# Patient Record
Sex: FEMALE | Race: AMERICAN INDIAN OR ALASKA NATIVE | Employment: FULL TIME | URBAN - METROPOLITAN AREA
[De-identification: names, ages, dates, MRNs, and addresses within clinical notes are randomized per-mention and may not be internally consistent; named-entity substitution may affect disease eponyms.]

---

## 2017-10-23 ENCOUNTER — TELEPHONE (OUTPATIENT)
Dept: PEDIATRICS | Facility: CLINIC | Age: 47
End: 2017-10-23

## 2017-10-23 NOTE — TELEPHONE ENCOUNTER
Called and spoke to Sabrina about [facility] appointments on 10/27/17.  Discussed what providers they would be seeing and where clinic is located.  Mom had no other questions at this time.

## 2017-10-27 ENCOUNTER — OFFICE VISIT (OUTPATIENT)
Dept: ENDOCRINOLOGY | Facility: CLINIC | Age: 47
End: 2017-10-27
Attending: INTERNAL MEDICINE
Payer: COMMERCIAL

## 2017-10-27 ENCOUNTER — OFFICE VISIT (OUTPATIENT)
Dept: PSYCHOLOGY | Facility: CLINIC | Age: 47
End: 2017-10-27
Attending: PSYCHOLOGIST
Payer: COMMERCIAL

## 2017-10-27 ENCOUNTER — OFFICE VISIT (OUTPATIENT)
Dept: PEDIATRICS | Facility: CLINIC | Age: 47
End: 2017-10-27
Attending: PEDIATRICS
Payer: COMMERCIAL

## 2017-10-27 VITALS
HEIGHT: 64 IN | WEIGHT: 284.39 LBS | SYSTOLIC BLOOD PRESSURE: 132 MMHG | BODY MASS INDEX: 48.55 KG/M2 | DIASTOLIC BLOOD PRESSURE: 84 MMHG | HEART RATE: 50 BPM

## 2017-10-27 DIAGNOSIS — E66.01 OBESITY, MORBID (H): Primary | ICD-10-CM

## 2017-10-27 DIAGNOSIS — E66.01 MORBID OBESITY (H): Primary | ICD-10-CM

## 2017-10-27 LAB
ALT SERPL W P-5'-P-CCNC: 65 U/L (ref 0–50)
ANION GAP SERPL CALCULATED.3IONS-SCNC: 6 MMOL/L (ref 3–14)
BUN SERPL-MCNC: 14 MG/DL (ref 7–30)
CALCIUM SERPL-MCNC: 8.8 MG/DL (ref 8.5–10.1)
CHLORIDE SERPL-SCNC: 108 MMOL/L (ref 94–109)
CO2 SERPL-SCNC: 26 MMOL/L (ref 20–32)
CREAT SERPL-MCNC: 0.68 MG/DL (ref 0.52–1.04)
GFR SERPL CREATININE-BSD FRML MDRD: >90 ML/MIN/1.7M2
GLUCOSE SERPL-MCNC: 99 MG/DL (ref 70–99)
HBA1C MFR BLD: 6.2 % (ref 4.3–6)
POTASSIUM SERPL-SCNC: 4.4 MMOL/L (ref 3.4–5.3)
SODIUM SERPL-SCNC: 140 MMOL/L (ref 133–144)

## 2017-10-27 PROCEDURE — 99212 OFFICE O/P EST SF 10 MIN: CPT | Mod: ZF

## 2017-10-27 PROCEDURE — 80048 BASIC METABOLIC PNL TOTAL CA: CPT | Performed by: INTERNAL MEDICINE

## 2017-10-27 PROCEDURE — 84460 ALANINE AMINO (ALT) (SGPT): CPT | Performed by: INTERNAL MEDICINE

## 2017-10-27 PROCEDURE — 83036 HEMOGLOBIN GLYCOSYLATED A1C: CPT | Performed by: INTERNAL MEDICINE

## 2017-10-27 PROCEDURE — 97802 MEDICAL NUTRITION INDIV IN: CPT | Performed by: DIETITIAN, REGISTERED

## 2017-10-27 PROCEDURE — 36415 COLL VENOUS BLD VENIPUNCTURE: CPT | Performed by: INTERNAL MEDICINE

## 2017-10-27 RX ORDER — TOPIRAMATE 25 MG/1
TABLET, FILM COATED ORAL
Qty: 90 TABLET | Refills: 3 | Status: SHIPPED | OUTPATIENT
Start: 2017-10-27 | End: 2018-01-26

## 2017-10-27 ASSESSMENT — PAIN SCALES - GENERAL: PAINLEVEL: NO PAIN (0)

## 2017-10-27 NOTE — MR AVS SNAPSHOT
After Visit Summary   10/27/2017    Sabrina Argeullo    MRN: [id number]           Patient Information     Date Of Birth          1970        Visit Information        Provider Department      10/27/2017 10:00 AM Chrissy Sandoval, PhD LP Peds Psychology        Today's Diagnoses     Obesity, morbid (H)    -  1       Follow-ups after your visit        Your next 10 appointments already scheduled     Dec 22, 2017  8:15 AM CST   Return Visit with Sherron Lopez RD   Peds Weight Management ([facility])    [facility]  3rd Flr  [address] [place] [id number] [phone]            Dec 22, 2017  9:00 AM CST   Return Visit with Chrissy Sandoval, PhD LP   Peds Psychology ([facility])    [facility]  [address] [facility]  [address] [place] [phone] [phone]              Who to contact     Please call your clinic at [phone] to:    Ask questions about your health    Make or cancel appointments    Discuss your medicines    Learn about your test results    Speak to your doctor   If you have compliments or concerns about an experience at your clinic, or if you wish to file a complaint, please contact [facility] Patient Relations at [phone] or email us at [email or web address]         Additional Information About Your Visit        MyChart Information     Integrated [employer] is an electronic gateway that provides easy, online access to your medical records. With Ketch[employer], you can request a clinic appointment, read your test results, renew a prescription or communicate with your care team.     To sign up for [employer] visit the website at [email or web address]   You will be asked to enter the access code listed below, as well as some personal information. Please follow the directions to create your username and password.     Your access code is: [email or web address]  Expires: 1/26/2018  2:23 PM     Your access code will   in 90 days. If you need help or a new code, please contact your [facility] or call [phone] for assistance.        Care EveryWhere ID     This is your Care EveryWhere ID. This could be used by other organizations to access your [place] medical records  [id number]         Blood Pressure from Last 3 Encounters:   10/27/17 132/84    Weight from Last 3 Encounters:   17 282 lb 3 oz (128 kg)   10/27/17 284 lb 6.3 oz (129 kg)              Today, you had the following     No orders found for display         Today's Medication Changes          These changes are accurate as of: 10/27/17 11:59 PM.  If you have any questions, ask your nurse or doctor.               Start taking these medicines.        Dose/Directions    topiramate 25 MG tablet   Commonly known as:  TOPAMAX   Used for:  Morbid obesity (H)   Started by:  Adria Shore MD        25 mg at bedtime for 1 week, 50 mg at bedtime for 1 week and 75 mg daily at bedtime thereafter   Quantity:  90 tablet   Refills:  3            Where to get your medicines      These medications were sent to [facility] [place], [place] -  [facility]   [facility] [address]     Phone:  [phone]     topiramate 25 MG tablet                Primary Care Provider Office Phone # Fax #    Lidia Grissom [phone]       [place] [id number] [facility] [id number]  [facility] [place] [address]        Equal Access to Services     RACHEL ELLINGTON AH: Hadii aad ku hadasho Soomaali, waaxda luqadaha, qaybta kaalmada adeegyada, luis dias . So [facility] [phone].    ATENCIÓN: Si habla español, tiene a pace disposición servicios gratuitos de asistencia lingüística. Llame al [phone].    We comply with applicable federal civil rights laws and [place] laws. We do not discriminate on the basis of race, color, national origin, age, disability, sex, sexual  orientation, or gender identity.            Thank you!     Thank you for choosing PEDS PSYCHOLOGY  for your care. Our goal is always to provide you with excellent care. Hearing back from our patients is one way we can continue to improve our services. Please take a few minutes to complete the written survey that you may receive in the mail after your visit with us. Thank you!             Your Updated Medication List - Protect others around you: Learn how to safely use, store and throw away your medicines at www.disposemymeds.org.          This list is accurate as of: 10/27/17 11:59 PM.  Always use your most recent med list.                   Brand Name Dispense Instructions for use Diagnosis    LORAZEPAM PO      Take by mouth as needed for anxiety        topiramate 25 MG tablet    TOPAMAX    90 tablet    25 mg at bedtime for 1 week, 50 mg at bedtime for 1 week and 75 mg daily at bedtime thereafter    Morbid obesity (H)
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
## 2017-10-27 NOTE — NURSING NOTE
"Chief Complaint   Patient presents with     Consult     Weight mgmt       Initial /79 (BP Location: Right arm, Patient Position: Chair, Cuff Size: Adult Large)  Pulse 50  Ht 5' 3.54\" (161.4 cm)  Wt 284 lb 6.3 oz (129 kg)  BMI 49.52 kg/m2 Estimated body mass index is 49.52 kg/(m^2) as calculated from the following:    Height as of this encounter: 5' 3.54\" (161.4 cm).    Weight as of this encounter: 284 lb 6.3 oz (129 kg).  Medication Reconciliation: complete    "

## 2017-10-27 NOTE — MR AVS SNAPSHOT
MRN:[id number]                      After Visit Summary   10/27/2017    Sabrina Arguello    MRN: [id number]           Visit Information        Provider Department      10/27/2017 9:30 AM Sherron Lopez RD Peds Weight Management        MyChart Information     [employer] Productions[employer] is an electronic gateway that provides easy, online access to your medical records. With [employer], you can request a clinic appointment, read your test results, renew a prescription or communicate with your care team.     To sign up for [employer] visit the website at [email or web address]   You will be asked to enter the access code listed below, as well as some personal information. Please follow the directions to create your username and password.     Your access code is: [email or web address]  Expires: 2018  3:23 PM     Your access code will  in 90 days. If you need help or a new code, please contact your [facility] or call [phone] for assistance.        Care EveryWhere ID     This is your Care EveryWhere ID. This could be used by other organizations to access your [place] medical records  [id number]        Equal Access to Services     RACHEL ELLINGTON : Hadkayden Braxton, sully simmons, jayro hernandes, luis barba. So [facility] [phone].    ATENCIÓN: Si habla español, tiene a pace disposición servicios gratuitos de asistencia lingüística. Llame al [phone].    We comply with applicable federal civil rights laws and [place] laws. We do not discriminate on the basis of race, color, national origin, age, disability, sex, sexual orientation, or gender identity.

## 2017-10-27 NOTE — LETTER
"  10/27/2017      RE: Sabrina Arguello  [address]  [facility] [address]       Medical Nutrition Therapy  Nutrition Assessment  Patient seen in Family/Pediatric Weight Mangement Clinic, accompanied by jenna.    Anthropometrics  Age:  46 year old female   Height:  161.4 cm (5' 3.54\")  Weight:  129 kg (284 lb 6.3 oz)  BMI: 49.62  Nutrition History  [facility] nutrition assessment. Patient lives with , son and daughter. She first brought her daughter to the pediatric weight management clinic and expressed a desire to lose weight herself. Patient will often skip breakfast if she doesn't have time. She will eat lunch at work - going out with a coworker about 1 time a week - mom states her coworker is helpful with making healthier choices. Meals are heavy in carbohydrates. She does have some characteristics of binge eating.     Nutritional Intakes  Sample intake includes:  Breakfast:   Skips - or oatmeal with blueberries  Am Snack:   None reported  Lunch:   Out with coworker 1x/week   PM Snack:   None reported  Dinner:   Chicken and rice or potatoes  HS Snack:   Peanuts and candy corn      Dining Out  Frequency:  1 times per week  Location:  restaurant  Types of Food:  BLT with soup or burger and fries      Medications/Vitamins/Minerals    Current Outpatient Prescriptions:      LORAZEPAM PO, Take by mouth as needed for anxiety, Disp: , Rfl:      topiramate (TOPAMAX) 25 MG tablet, 25 mg at bedtime for 1 week, 50 mg at bedtime for 1 week and 75 mg daily at bedtime thereafter, Disp: 90 tablet, Rfl: 3    Nutrition Diagnosis  Obesity related to excessive energy intake as evidenced by BMI/age >95th %ile    Interventions & Education  Provided written and verbal education on the following:    Healthy snacks  Consume 3 or 4 meals a day  Breakfast    Reviewed dietary recall and patient's progress since last appointment. Discussed with patient the " importance of eating breakfast daily. Brainstormed healthy quick breakfast options. Mom was wondering about protein shakes - provided guidelines when purchasing protein shakes (low sugar). Discussed healthy snacks to include a fruit or vegetable + protein source. Brainstormed healthy snack options to have at home and encouraged patient to get rid of the tempting foods - chips, crackers, ice cream, peanuts with candy corn. Briefly talked about changes to make when eating out - keep calories close to 500 at a meal.     Goals  1) Reduce BMI  2) Eat breakfast daily  3) Healthy snacks - fruit or vegetable + protein  4) Choose healthy option eating out    Monitoring/Evaluation  Will continue to monitor progress towards goals and provide education in Family/Pediatric Weight Management.    Spent 45 minutes in consult with patient & daughter.      Sherron Lopez MS, RD, LD  Pager # [phone]

## 2017-10-27 NOTE — LETTER
Patient:  Sabrina Arguello  :   1970  MRN:     [id number]        Ms.Naomi Radha Arguello  [address] [facility] [address]        2017    Dear ,    We are writing to inform you of your test results.  The HbA1c, which reflects about 2-3 months of average blood sugar control, is consitent with pre-diabetes.  She ALT liver test is minimally above the typical range and could be related to fat in the liver.  Working on wt loss is a good way to help improve both of these.  How are things going with the new topiramate medication? Let us know if you have any questions or concerns between visits.      Resulted Orders   Hemoglobin A1c   Result Value Ref Range    Hemoglobin A1C 6.2 (H) 4.3 - 6.0 %   ALT   Result Value Ref Range    ALT 65 (H) 0 - 50 U/L   Basic metabolic panel   Result Value Ref Range    Sodium 140 133 - 144 mmol/L    Potassium 4.4 3.4 - 5.3 mmol/L    Chloride 108 94 - 109 mmol/L    Carbon Dioxide 26 20 - 32 mmol/L    Anion Gap 6 3 - 14 mmol/L    Glucose 99 70 - 99 mg/dL    Urea Nitrogen 14 7 - 30 mg/dL    Creatinine 0.68 0.52 - 1.04 mg/dL    GFR Estimate >90 >60 mL/min/1.7m2      Comment:      Non  GFR Calc    GFR Estimate If Black >90 >60 mL/min/1.7m2      Comment:       GFR Calc    Calcium 8.8 8.5 - 10.1 mg/dL       Adria Shore MD

## 2017-10-27 NOTE — LETTER
Date:December 11, 2017      Provider requested that no letter be sent. Do not send.       [facility] Health Information

## 2017-10-27 NOTE — LETTER
10/27/2017      RE: Sabrina Arguello  [address]  [facility] [address]       Pediatric Psychology Contact Note    Sabrina is a 47-year-old female seen in Family Weight Management Clinic with her daughter. On initial meeting, the role of psychology was introduced. The remainder of the time was spent discussing daughter's progress with weight management. During next family clinic, will spend more time assessing overall family dynamic.    Chrissy Sandoval, PhD, LP, BCBA-D   of Pediatrics  Board Certified Behavior Analyst-Doctoral  Department of Pediatrics    *no charge brief contact  * no letter    Chrissy Sandoval LP, PhD LP

## 2017-10-27 NOTE — PROGRESS NOTES
"Medical Nutrition Therapy  Nutrition Assessment  Patient seen in Family/Pediatric Weight Mangement Clinic, accompanied by jenna.    Anthropometrics  Age:  46 year old female   Height:  161.4 cm (5' 3.54\")  Weight:  129 kg (284 lb 6.3 oz)  BMI: 49.62  Nutrition History  [facility] nutrition assessment. Patient lives with , son and daughter. She first brought her daughter to the pediatric weight management clinic and expressed a desire to lose weight herself. Patient will often skip breakfast if she doesn't have time. She will eat lunch at work - going out with a coworker about 1 time a week - mom states her coworker is helpful with making healthier choices. Meals are heavy in carbohydrates. She does have some characteristics of binge eating.     Nutritional Intakes  Sample intake includes:  Breakfast:   Skips - or oatmeal with blueberries  Am Snack:   None reported  Lunch:   Out with coworker 1x/week   PM Snack:   None reported  Dinner:   Chicken and rice or potatoes  HS Snack:   Peanuts and candy corn      Dining Out  Frequency:  1 times per week  Location:  restaurant  Types of Food:  BLT with soup or burger and fries      Medications/Vitamins/Minerals    Current Outpatient Prescriptions:      LORAZEPAM PO, Take by mouth as needed for anxiety, Disp: , Rfl:      topiramate (TOPAMAX) 25 MG tablet, 25 mg at bedtime for 1 week, 50 mg at bedtime for 1 week and 75 mg daily at bedtime thereafter, Disp: 90 tablet, Rfl: 3    Nutrition Diagnosis  Obesity related to excessive energy intake as evidenced by BMI/age >95th %ile    Interventions & Education  Provided written and verbal education on the following:    Healthy snacks  Consume 3 or 4 meals a day  Breakfast    Reviewed dietary recall and patient's progress since last appointment. Discussed with patient the importance of eating breakfast daily. Brainstormed healthy quick breakfast options. Mom was wondering " about protein shakes - provided guidelines when purchasing protein shakes (low sugar). Discussed healthy snacks to include a fruit or vegetable + protein source. Brainstormed healthy snack options to have at home and encouraged patient to get rid of the tempting foods - chips, crackers, ice cream, peanuts with candy corn. Briefly talked about changes to make when eating out - keep calories close to 500 at a meal.     Goals  1) Reduce BMI  2) Eat breakfast daily  3) Healthy snacks - fruit or vegetable + protein  4) Choose healthy option eating out    Monitoring/Evaluation  Will continue to monitor progress towards goals and provide education in Family/Pediatric Weight Management.    Spent 45 minutes in consult with patient & daughter.      Sherron Lopez MS, RD, LD  Pager # [phone]

## 2017-11-15 ENCOUNTER — TELEPHONE (OUTPATIENT)
Dept: PEDIATRICS | Facility: CLINIC | Age: 47
End: 2017-11-15

## 2017-11-15 NOTE — TELEPHONE ENCOUNTER
Called and spoke to Sabrina to check in to see how Topiramate is working for her.  Sabrina reports the new medication is fine.  She has noticed a decrease in her appetite over the past couple of days.  She started taking 75mg on Saturday, 11/11/17.  In the beginning she did feel like her anxiety increased, but now that has gone away.  She occasionally feels tingling in her hands and feet, but it has only happened a couple of time.  No other side effects noticed.      Reminded her of appointment with dietitian on 11/29/17.  Sabrina had no other questions at this time.
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
## 2017-11-29 ENCOUNTER — OFFICE VISIT (OUTPATIENT)
Dept: PEDIATRICS | Facility: CLINIC | Age: 47
End: 2017-11-29
Attending: DIETITIAN, REGISTERED
Payer: COMMERCIAL

## 2017-11-29 PROCEDURE — 97803 MED NUTRITION INDIV SUBSEQ: CPT | Performed by: DIETITIAN, REGISTERED

## 2017-11-29 NOTE — MR AVS SNAPSHOT
MRN:[id number]                      After Visit Summary   2017    Sabrina Arguello    MRN: [id number]           Visit Information        Provider Department      2017 2:30 PM Sherron Lopez RD Peds Weight Management        Your next 10 appointments already scheduled     Dec 22, 2017  8:15 AM CST   Return Visit with Sherron Lopez RD   Peds Weight Management ([facility])    [facility]  [address]rd [facility]  [address] [place] [address]   [phone]            Dec 22, 2017  9:00 AM CST   Return Visit with Chrissy Sandoval, PhD LP   Peds Psychology ([facility])    [facility]  [address]  [address] [place] [address]   [phone]              Georama Information     Georama is an electronic gateway that provides easy, online access to your medical records. With Georama, you can request a clinic appointment, read your test results, renew a prescription or communicate with your care team.     To sign up for Ge[employer] visit the website at [email or web address]   You will be asked to enter the access code listed below, as well as some personal information. Please follow the directions to create your username and password.     Your access code is: [email or web address]  Expires: 2018  2:23 PM     Your access code will  in 90 days. If you need help or a new code, please contact your [facility] or call [phone] for assistance.        Care EveryWhere ID     This is your Care EveryWhere ID. This could be used by other organizations to access your [facility] medical records  [id number]        Equal Access to Services     RACHEL ELLINGTON : Hadii neo santacruz hadasho Sorosendaali, waaxda luqadaha, qaybta kaalmada adejustine, luis barba. So [facility] [phone].    ATENCIÓN: Si habla español, tiene a pace disposición servicios gratuitos de asistencia lingüística. Llame al [phone].    We  comply with applicable federal civil rights laws and [place] laws. We do not discriminate on the basis of race, color, national origin, age, disability, sex, sexual orientation, or gender identity.

## 2017-11-29 NOTE — LETTER
"  11/29/2017      RE: Sabrina Arguello  [address]  [facility] [address]       Medical Nutrition Therapy  Nutrition Reassessment  Patient seen in Family/Pediatric Weight Mangement Clinic, accompanied by her daughter.    Anthropometrics  Age:  46 year old female   Height:  161.4 cm  Normalized stature-for-age data not available for patients older than 20 years.    Weight:  128 kg (actual weight), 282 lbs 3.02 oz, Normalized weight-for-age data not available for patients older than 20 years.  BMI:  Body mass index is 49.14 kg/(m^2)., Normalized BMI data available only for age 0 to 20 years.  Weight Loss 2 lbs since last clinic visit on 10/27/17.  Nutrition History  Patient seen in [facility] for family weight management follow up. At last visit patient was started on topiramate and has been on the 3 pill dose for 2 weeks. She states she has noticed a difference and even thinks her taste buds have changed (some foods do no taste as good). She also feels that being on the medication has calmed her mood (her daughter agrees). She reports that she doesn't want a \"big meal\" any more. Patient was also able to cut out pop (doesn't taste good any more). The family as a whole has really decreased eating out - maybe a couple times in the past month.     Medications/Vitamins/Minerals    Current Outpatient Prescriptions:      LORAZEPAM PO, Take by mouth as needed for anxiety, Disp: , Rfl:      topiramate (TOPAMAX) 25 MG tablet, 25 mg at bedtime for 1 week, 50 mg at bedtime for 1 week and 75 mg daily at bedtime thereafter, Disp: 90 tablet, Rfl: 3    Previous Goals & Progress  1) Reduce BMI - ongoing goal (lost 2 lbs)  2) Eat breakfast daily - ongoing goal (still working on it)  3) Healthy snacks - fruit or vegetable + protein - ongoing goal   4) Choose healthy option eating out - ongoing goal     Nutrition Diagnosis  Obesity related to excessive energy intake as evidenced by BMI/age >95th " %ile    Interventions & Education  Provided written and verbal education on the following:    Food record  Plate Method  Healthy snacks  Healthy beverages  Portion sizes  Increase fruit and vegetable intake  Breakfast    Reviewed previous nutrition goals and patient's progress since last appointment. Discussed patient's goal with eating breakfast more consistently - mom states she tried one of the protein shakes but found it to be very chalky tasting but was going to try the other options we had discussed at the previous visit. Talked about tracking eating with food logs and encouraged her to to help create more awareness - talked about using the [employer] Pal or handwritten. Encouraged patient to continue with previous goals and have a goal to lose at least 4 lbs before the next appointment in December.     Goals  1) Reduce BMI  2) Food log - alec or hand written  3) Work on eating breakfast more consistently   4) Continue to monitor portion sizes    Monitoring/Evaluation  Will continue to monitor progress towards goals and provide education in Family/Pediatric Weight Management.    Spent 30 minutes in consult with patient & daughter.      Sherron Lopez MS, RD, LD  Pager # [phone]

## 2017-12-01 VITALS — WEIGHT: 282.19 LBS | BODY MASS INDEX: 48.18 KG/M2 | HEIGHT: 64 IN

## 2017-12-01 NOTE — PROGRESS NOTES
"Medical Nutrition Therapy  Nutrition Reassessment  Patient seen in Family/Pediatric Weight Mangement Clinic, accompanied by her daughter.    Anthropometrics  Age:  46 year old female   Height:  161.4 cm  Normalized stature-for-age data not available for patients older than 20 years.    Weight:  128 kg (actual weight), 282 lbs 3.02 oz, Normalized weight-for-age data not available for patients older than 20 years.  BMI:  Body mass index is 49.14 kg/(m^2)., Normalized BMI data available only for age 0 to 20 years.  Weight Loss 2 lbs since last clinic visit on 10/27/17.  Nutrition History  Patient seen in [facility] for family weight management follow up. At last visit patient was started on topiramate and has been on the 3 pill dose for 2 weeks. She states she has noticed a difference and even thinks her taste buds have changed (some foods do no taste as good). She also feels that being on the medication has calmed her mood (her daughter agrees). She reports that she doesn't want a \"big meal\" any more. Patient was also able to cut out pop (doesn't taste good any more). The family as a whole has really decreased eating out - maybe a couple times in the past month.     Medications/Vitamins/Minerals    Current Outpatient Prescriptions:      LORAZEPAM PO, Take by mouth as needed for anxiety, Disp: , Rfl:      topiramate (TOPAMAX) 25 MG tablet, 25 mg at bedtime for 1 week, 50 mg at bedtime for 1 week and 75 mg daily at bedtime thereafter, Disp: 90 tablet, Rfl: 3    Previous Goals & Progress  1) Reduce BMI - ongoing goal (lost 2 lbs)  2) Eat breakfast daily - ongoing goal (still working on it)  3) Healthy snacks - fruit or vegetable + protein - ongoing goal   4) Choose healthy option eating out - ongoing goal     Nutrition Diagnosis  Obesity related to excessive energy intake as evidenced by BMI/age >95th %ile    Interventions & Education  Provided written and verbal education on the following:    Food record  Plate " Method  Healthy snacks  Healthy beverages  Portion sizes  Increase fruit and vegetable intake  Breakfast    Reviewed previous nutrition goals and patient's progress since last appointment. Discussed patient's goal with eating breakfast more consistently - mom states she tried one of the protein shakes but found it to be very chalky tasting but was going to try the other options we had discussed at the previous visit. Talked about tracking eating with food logs and encouraged her to to help create more awareness - talked about using the [employer] Pal or handwritten. Encouraged patient to continue with previous goals and have a goal to lose at least 4 lbs before the next appointment in December.     Goals  1) Reduce BMI  2) Food log - alec or hand written  3) Work on eating breakfast more consistently   4) Continue to monitor portion sizes    Monitoring/Evaluation  Will continue to monitor progress towards goals and provide education in Family/Pediatric Weight Management.    Spent 30 minutes in consult with patient & daughter.      Sherron Lopez MS, RD, LD  Pager # [phone]

## 2017-12-11 NOTE — PROGRESS NOTES
Pediatric Psychology Contact Note    Sabrina is a 47-year-old female seen in Family Weight Management Clinic with her daughter. On initial meeting, the role of psychology was introduced. The remainder of the time was spent discussing daughter's progress with weight management. During next family clinic, will spend more time assessing overall family dynamic.    Chrissy Sandoval, PhD, LP, BCBA-D   of Pediatrics  Board Certified Behavior Analyst-Doctoral  Department of Pediatrics    *no charge brief contact  * no letter

## 2017-12-20 ENCOUNTER — TELEPHONE (OUTPATIENT)
Dept: PEDIATRICS | Facility: CLINIC | Age: 47
End: 2017-12-20

## 2017-12-20 NOTE — TELEPHONE ENCOUNTER
Called and spoke to Sabrina.  Discussed lab letter.  Sabrina had no questions at this time.    Also, discussed Topiramate.  Sabrina reports that it has curbed her appetite.  She has noticed in the past week that she feels like the medication is leveling off.  She feels her appetite is starting to increase gradually.  Sabrina is currently taking 75mg q day.  She is no longer experiencing the tingling she felt in the beginning.  She denies other side effects.      Reminded her of appointment with dietitian on 12/22/17.  She had no other questions at this time.

## 2017-12-22 ENCOUNTER — OFFICE VISIT (OUTPATIENT)
Dept: PEDIATRICS | Facility: CLINIC | Age: 47
End: 2017-12-22
Attending: DIETITIAN, REGISTERED
Payer: COMMERCIAL

## 2017-12-22 ENCOUNTER — OFFICE VISIT (OUTPATIENT)
Dept: PSYCHOLOGY | Facility: CLINIC | Age: 47
End: 2017-12-22
Attending: PSYCHOLOGIST
Payer: COMMERCIAL

## 2017-12-22 VITALS — BODY MASS INDEX: 47.84 KG/M2 | HEIGHT: 64 IN | WEIGHT: 280.2 LBS

## 2017-12-22 DIAGNOSIS — E66.01 MORBID OBESITY (H): Primary | ICD-10-CM

## 2017-12-22 PROCEDURE — 97803 MED NUTRITION INDIV SUBSEQ: CPT | Performed by: DIETITIAN, REGISTERED

## 2017-12-22 NOTE — MR AVS SNAPSHOT
MRN:[id number]                      After Visit Summary   2017    Sabrina Arguello    MRN: [id number]           Visit Information        Provider Department      2017 8:15 AM Sherron Lopez RD Peds Weight Management        Your next 10 appointments already scheduled     2018  8:30 AM CST   Return Weight Management Visit with Adria Shore MD   [facility] Adult Weight Mgt [facility] ([facility])    [facility] [address] 3[address]  [facility] [address]   [phone]            2018  9:30 AM CST   Return Visit with Chrissy Sandoval, PhD LP   Peds Psychology ([facility])    [place] [address] [address] [facility] [address]  [address] [place] [address] [phone]            2018 10:30 AM CST   Return Visit with JEFFY Dickenss Weight Management ([facility])    [address]  [facility] [place] [address]   [phone]              [email or web address] Information     [email or web address] is an electronic gateway that provides easy, online access to your medical records. With [email or web address], you can request a clinic appointment, read your test results, renew a prescription or communicate with your care team.     To sign up for [email or web address] visit the website at [email or web address]   You will be asked to enter the access code listed below, as well as some personal information. Please follow the directions to create your username and password.     Your access code is: [email or web address]  Expires: 2018  2:23 PM     Your access code will  in 90 days. If you need help or a new code, please contact your [facility] or call [phone] for assistance.        Care EveryWhere ID     This is your Care EveryWhere ID. This could be used by other organizations to access your [facility] medical records  [id number]        Equal Access to Services     RACHEL ELLINGTON : Tmoas Braxton  sully simmons, priscillata sanjuana hernandes, luis dias ah. So [facility] [phone].    ATENCIÓN: Si habla español, tiene a pace disposición servicios gratuitos de asistencia lingüística. Llame al [phone].    We comply with applicable federal civil rights laws and [place] laws. We do not discriminate on the basis of race, color, national origin, age, disability, sex, sexual orientation, or gender identity.

## 2017-12-22 NOTE — PROGRESS NOTES
Pediatric Psychology Progress Note    Start time: 9:00am  Stop time: 9:15am  Service: [id number]  Diagnosis: obesity    Subjective: Sabrina is a 47 year old female seen in family weight management clinic with her daughter.     Objective: Spent the session discussing changes the family has made, including removing tempting food. Sabrina was open about her inability to control eating if these are in the home. She has noticed some increase in ability to control (e.g., she can leave ice cream in freezer that is for her son). She reported that she misses Diet Coke, which does not taste the same since starting Topiramate; however, she reported that the pros far outweigh the cons of taking the medication. Her primary goal of coming to clinic is to establish good habits for her daughter; secondarily, she would like to improve her own health.     Assessment: Sabrina was open and honest in session about her own strengths and difficulties, as well as her family's. She seems motivated to continue on the path to healthier lifestyle.    Plan: Follow up in one month.    Chrissy Sandoval, PhD, LP, BCBA-D   of Pediatrics  Board Certified Behavior Analyst-Doctoral  Department of Pediatrics    *no letter

## 2017-12-22 NOTE — LETTER
12/22/2017      RE: Sabrina Arguello  [address]  [facility] [address]       Medical Nutrition Therapy  Nutrition Reassessment  Patient seen in Family/Pediatric Weight Mangement Clinic, accompanied by daughter.    Anthropometrics  Age:  47 year old female   Height:  161.4 cm  Normalized stature-for-age data not available for patients older than 20 years.    Weight:  127.1 kg (actual weight), 280 lbs 3.27 oz, Normalized weight-for-age data not available for patients older than 20 years.  BMI:  Body mass index is 48.79 kg/(m^2)., Normalized BMI data available only for age 0 to 20 years.  Weight Loss 2 lbs since last clinic visit on 11/29/17.  Nutrition History  Patient seen in [facility] for weight management follow up. Patient has lost 2 lbs in the past month. Patient has been trying to eat breakfast - tried Atkins strawberry protein shake and likes them. She will also eat the leftovers of her kids breakfast (1 slice of french toast or omelet). The family has really made great changes together - eating meals more at home. Last night they had pizza and would have eaten 2 whole pizza as a family of 4 but only 1 (less than 1). Patient states her appetite is down a lot since starting the topiramate. She also states her taste buds are different - the taste of pop and beer are not good any more.     Medications/Vitamins/Minerals    Current Outpatient Prescriptions:      LORAZEPAM PO, Take by mouth as needed for anxiety, Disp: , Rfl:      topiramate (TOPAMAX) 25 MG tablet, 25 mg at bedtime for 1 week, 50 mg at bedtime for 1 week and 75 mg daily at bedtime thereafter, Disp: 90 tablet, Rfl: 3    Previous Goals & Progress  1) Reduce BMI - ongoing goal (lost 2 lb)  2) Food log - alec or hand written - goal not met (forgot to do it)  3) Work on eating breakfast more consistently - ongoing goal   4) Continue to monitor portion sizes - ongoing goal     Nutrition Diagnosis  Obesity related to excessive energy  intake as evidenced by BMI/age >95th %ile    Interventions & Education  Provided written and verbal education on the following:    Food record  Plate Method  Healthy lunchs  Healthy meals/cooking  Healthy snacks  Healthy beverages  Portion sizes  Increase fruit and vegetable intake  Consume 3 or 4 meals a day    Goals  1) Reduce BMI  2) Continue to eat regularly throughout the day  3) Continue to decrease portion sizes     Monitoring/Evaluation  Will continue to monitor progress towards goals and provide education in Family/Pediatric Weight Management.    Spent 30 minutes in consult with patient & daugther.      Sherron Lopez MS, RD, LD  Pager # [phone]

## 2017-12-22 NOTE — LETTER
12/22/2017      RE: Sabrina Arguello  [address]  [facility] [address]       Pediatric Psychology Progress Note    Start time: 9:00am  Stop time: 9:15am  Service: [id number]  Diagnosis: obesity    Subjective: Sabrina is a 47 year old female seen in family weight management clinic with her daughter.     Objective: Spent the session discussing changes the family has made, including removing tempting food. Sabrina was open about her inability to control eating if these are in the home. She has noticed some increase in ability to control (e.g., she can leave ice cream in freezer that is for her son). She reported that she misses Diet Coke, which does not taste the same since starting Topiramate; however, she reported that the pros far outweigh the cons of taking the medication. Her primary goal of coming to clinic is to establish good habits for her daughter; secondarily, she would like to improve her own health.     Assessment: Sabrina was open and honest in session about her own strengths and difficulties, as well as her family's. She seems motivated to continue on the path to healthier lifestyle.    Plan: Follow up in one month.    Chrissy Sandoval, PhD, LP, BCBA-D   of Pediatrics  Board Certified Behavior Analyst-Doctoral  Department of Pediatrics    *no letter      Chrissy Sandoval, MICHA, PhD LP

## 2017-12-22 NOTE — MR AVS SNAPSHOT
After Visit Summary   2017    Sabrina Arguello    MRN: [id number]           Patient Information     Date Of Birth          1970        Visit Information        Provider Department      2017 9:00 AM Chrissy Sandoval, PhD LP Peds Psychology        Today's Diagnoses     Morbid obesity (H)    -  1       Follow-ups after your visit        Your next 10 appointments already scheduled     2018  8:30 AM CST   (Arrive by 8:15 AM)   Return Visit with Adria Shore MD   [facility] Medical Weight Management ([facility])    [address] [place] [id number] [phone]              Who to contact     Please call your clinic at [phone] to:    Ask questions about your health    Make or cancel appointments    Discuss your medicines    Learn about your test results    Speak to your doctor   If you have compliments or concerns about an experience at your clinic, or if you wish to file a complaint, please contact [facility] Patient Relations at [phone] or email us at [email or web address]         Additional Information About Your Visit        MyChart Information     HobbyTalkt is an electronic gateway that provides easy, online access to your medical records. With SpiceCSM, you can request a clinic appointment, read your test results, renew a prescription or communicate with your care team.     To sign up for HobbyTalkt visit the website at [email or web address]   You will be asked to enter the access code listed below, as well as some personal information. Please follow the directions to create your username and password.     Your access code is: [email or web address]  Expires: 2018  2:23 PM     Your access code will  in 90 days. If you need help or a new code, please contact your [facility] or call [phone] for assistance.        Care  EveryWhere ID     This is your Care EveryWhere ID. This could be used by other organizations to access your [place] medical records  [id number]         Blood Pressure from Last 3 Encounters:   10/27/17 132/84    Weight from Last 3 Encounters:   12/22/17 280 lb 3.3 oz (127.1 kg)   11/29/17 282 lb 3 oz (128 kg)   10/27/17 284 lb 6.3 oz (129 kg)              We Performed the Following     HEAL & BEHAV INTERV,EA 15 MIN,FAM W/PT        Primary Care Provider Office Phone # Fax #    Lidia Grissom [phone]       [facility] [id number]  [facility] [id number]        Equal Access to Services     RACHEL ELLINGTON : Hadii aad ku hadasho Soomaali, waaxda luqadaha, qaybta kaalmada adeegyada, waxay idiin haycampbell dias . So [facility] [phone].    ATENCIÓN: Si habla español, tiene a pace disposición servicios gratuitos de asistencia lingüística. [facility] [phone].    We comply with applicable federal civil rights laws and [place] laws. We do not discriminate on the basis of race, color, national origin, age, disability, sex, sexual orientation, or gender identity.            Thank you!     Thank you for choosing [facility]  for your care. Our goal is always to provide you with excellent care. Hearing back from our patients is one way we can continue to improve our services. Please take a few minutes to complete the written survey that you may receive in the mail after your visit with us. Thank you!             Your Updated Medication List - Protect others around you: Learn how to safely use, store and throw away your medicines at www.disposemymeds.org.          This list is accurate as of: 12/22/17  9:38 AM.  Always use your most recent med list.                   Brand Name Dispense Instructions for use Diagnosis    LORAZEPAM PO      Take by mouth as needed for anxiety        topiramate 25 MG tablet    TOPAMAX    90 tablet    25 mg at bedtime for 1 week, 50 mg at  bedtime for 1 week and 75 mg daily at bedtime thereafter    Morbid obesity (H)

## 2017-12-22 NOTE — PROGRESS NOTES
Medical Nutrition Therapy  Nutrition Reassessment  Patient seen in Family/Pediatric Weight Mangement Clinic, accompanied by daughter.    Anthropometrics  Age:  47 year old female   Height:  161.4 cm  Normalized stature-for-age data not available for patients older than 20 years.    Weight:  127.1 kg (actual weight), 280 lbs 3.27 oz, Normalized weight-for-age data not available for patients older than 20 years.  BMI:  Body mass index is 48.79 kg/(m^2)., Normalized BMI data available only for age 0 to 20 years.  Weight Loss 2 lbs since last clinic visit on 11/29/17.  Nutrition History  Patient seen in [facility] for weight management follow up. Patient has lost 2 lbs in the past month. Patient has been trying to eat breakfast - tried Atkins strawberry protein shake and likes them. She will also eat the leftovers of her kids breakfast (1 slice of french toast or omelet). The family has really made great changes together - eating meals more at home. Last night they had pizza and would have eaten 2 whole pizza as a family of 4 but only 1 (less than 1). Patient states her appetite is down a lot since starting the topiramate. She also states her taste buds are different - the taste of pop and beer are not good any more.     Medications/Vitamins/Minerals    Current Outpatient Prescriptions:      LORAZEPAM PO, Take by mouth as needed for anxiety, Disp: , Rfl:      topiramate (TOPAMAX) 25 MG tablet, 25 mg at bedtime for 1 week, 50 mg at bedtime for 1 week and 75 mg daily at bedtime thereafter, Disp: 90 tablet, Rfl: 3    Previous Goals & Progress  1) Reduce BMI - ongoing goal (lost 2 lb)  2) Food log - alec or hand written - goal not met (forgot to do it)  3) Work on eating breakfast more consistently - ongoing goal   4) Continue to monitor portion sizes - ongoing goal     Nutrition Diagnosis  Obesity related to excessive energy intake as evidenced by BMI/age >95th %ile    Interventions & Education  Provided written and  verbal education on the following:    Food record  Plate Method  Healthy lunchs  Healthy meals/cooking  Healthy snacks  Healthy beverages  Portion sizes  Increase fruit and vegetable intake  Consume 3 or 4 meals a day    Goals  1) Reduce BMI  2) Continue to eat regularly throughout the day  3) Continue to decrease portion sizes     Monitoring/Evaluation  Will continue to monitor progress towards goals and provide education in Family/Pediatric Weight Management.    Spent 30 minutes in consult with patient & daugther.      Sherron Lopez MS, RD, LD  Pager # [phone]

## 2017-12-22 NOTE — LETTER
Date:December 26, 2017      Provider requested that no letter be sent. Do not send.       [facility] Health Information

## 2018-01-24 ENCOUNTER — TELEPHONE (OUTPATIENT)
Dept: PEDIATRICS | Facility: CLINIC | Age: 48
End: 2018-01-24

## 2018-01-26 ENCOUNTER — OFFICE VISIT (OUTPATIENT)
Dept: ENDOCRINOLOGY | Facility: CLINIC | Age: 48
End: 2018-01-26
Attending: INTERNAL MEDICINE
Payer: COMMERCIAL

## 2018-01-26 ENCOUNTER — OFFICE VISIT (OUTPATIENT)
Dept: PEDIATRICS | Facility: CLINIC | Age: 48
End: 2018-01-26
Attending: INTERNAL MEDICINE
Payer: COMMERCIAL

## 2018-01-26 VITALS
HEIGHT: 64 IN | BODY MASS INDEX: 47.5 KG/M2 | DIASTOLIC BLOOD PRESSURE: 66 MMHG | SYSTOLIC BLOOD PRESSURE: 131 MMHG | HEART RATE: 52 BPM | WEIGHT: 278.22 LBS

## 2018-01-26 DIAGNOSIS — E66.01 MORBID OBESITY DUE TO EXCESS CALORIES (H): ICD-10-CM

## 2018-01-26 PROCEDURE — 97803 MED NUTRITION INDIV SUBSEQ: CPT | Performed by: DIETITIAN, REGISTERED

## 2018-01-26 PROCEDURE — G0463 HOSPITAL OUTPT CLINIC VISIT: HCPCS | Mod: ZF

## 2018-01-26 RX ORDER — TOPIRAMATE 25 MG/1
TABLET, FILM COATED ORAL
Qty: 270 TABLET | Refills: 0 | Status: SHIPPED | OUTPATIENT
Start: 2018-01-26 | End: 2018-02-23

## 2018-01-26 ASSESSMENT — ENCOUNTER SYMPTOMS
FATIGUE: 0
ALTERED TEMPERATURE REGULATION: 0
WEIGHT LOSS: 0
NIGHT SWEATS: 1
NECK MASS: 0
BRUISES/BLEEDS EASILY: 0
TROUBLE SWALLOWING: 0
SINUS PAIN: 0
SMELL DISTURBANCE: 0
SINUS CONGESTION: 0
POLYDIPSIA: 0
WEIGHT GAIN: 0
SORE THROAT: 0
TASTE DISTURBANCE: 1
DECREASED APPETITE: 0
CHILLS: 0
SWOLLEN GLANDS: 0
POLYPHAGIA: 0
HALLUCINATIONS: 0
FEVER: 1
INCREASED ENERGY: 0
HOARSE VOICE: 0

## 2018-01-26 ASSESSMENT — PAIN SCALES - GENERAL: PAINLEVEL: NO PAIN (0)

## 2018-01-26 NOTE — PROGRESS NOTES
"Medical Nutrition Therapy  Nutrition Reassessment  Patient seen in Family/Pediatric Weight Mangement Clinic, accompanied by daughter.    Anthropometrics  Age:  47 year old female   Height: 161.5 cm (5' 3.58\")  Weight:  126.2 kg (278 lb 3.5 oz)  BMI:  48.49  Weight Loss 2 lbs since last clinic visit on 12/22/17.  Nutrition History  Patient seen in [facility] for family weight management follow up. Patient has been able to lose about 2 lbs in the past month. She admits that she off track over the holidays and still needs to get back to the changes she was doing previously. They have been able to keep fast food at a minimal but have been going to sit down restaurants more lately. Patient feels she is doing pretty well still and no struggles. She might have a craving for sweets - she will bake cookies but states she only does this 1 time in every 3 weeks. There were a few other incidents named in clinic - gets sugar free pudding but adds whip cream on top or family had root beer floats last Friday night. She does feel the topiramate has been working well for her - taste buds are still changed and appetite down.      Medications/Vitamins/Minerals    Current Outpatient Prescriptions:      topiramate (TOPAMAX) 25 MG tablet, 75 mg daily at supper, Disp: 270 tablet, Rfl: 0     LORAZEPAM PO, Take by mouth as needed for anxiety, Disp: , Rfl:      [DISCONTINUED] topiramate (TOPAMAX) 25 MG tablet, 25 mg at bedtime for 1 week, 50 mg at bedtime for 1 week and 75 mg daily at bedtime thereafter, Disp: 90 tablet, Rfl: 3    Previous Goals & Progress  1) Reduce BMI - ongoing goal (lost 2 lb)  2) Continue to eat regularly throughout the day - ongoing goal   3) Continue to decrease portion sizes  - ongoing goal     Nutrition Diagnosis  Obesity related to excessive energy intake as evidenced by BMI/age >95th %ile    Interventions & Education  Provided written and verbal education on the following:    Food record  Plate " Method  Healthy snacks  Healthy beverages  Portion sizes  Increase fruit and vegetable intake    Reviewed previous nutrition goals and patient's progress since last appointment. Discussed patient's progress since staring the medication and how she could improve her results she is getting by looking more closely at her food choices. Used examples like adding Whipped cream to sugar free pudding or root beer floats on how things can add up quickly. Strongly encouraged mom to log her food intake to create more awareness of foods that she is eating. Answered nutrition-related questions that  pt had, and worked with her to set nutrition goals to work towards until next visit.    Goals  1) Reduce BMI  2) Food log daily  3) Decrease extra add-ons - like whip cream or treats  4) Continue to monitor portion sizes    Monitoring/Evaluation  Will continue to monitor progress towards goals and provide education in Family/Pediatric Weight Management.    Spent 30 minutes in consult with patient & daughter.      Sherron Lopez MS, RD, LD  Pager # [phone]

## 2018-01-26 NOTE — NURSING NOTE
"Chief Complaint   Patient presents with     RECHECK     WM Follow-up/Family Clinic       Initial /66 (BP Location: Right arm, Patient Position: Sitting, Cuff Size: Adult Large)  Pulse 52  Ht 5' 3.58\" (161.5 cm)  Wt 278 lb 3.5 oz (126.2 kg)  BMI 48.38 kg/m2 Estimated body mass index is 48.38 kg/(m^2) as calculated from the following:    Height as of this encounter: 5' 3.58\" (161.5 cm).    Weight as of this encounter: 278 lb 3.5 oz (126.2 kg).  Medication Reconciliation: complete     Yakelin Smith      "

## 2018-01-26 NOTE — PROGRESS NOTES
"        Return Medical Weight Management Note     Sabrina Arguello  MRN:  [id number]  :  1970  LCARI:  2018    Dear Lidia Grissom,    I had the pleasure of seeing your patient Sabrina Arguello.  She is a 47 year old female who I am continuing to see for treatment of obesity related to:  Anxiety  Lower ext edema    INTERVAL HISTORY:  She is on topiramate and tolerating this without any side effects; she titrated up to 75 mg daily and does wonder more recently if the effect might be waning but is still getting good support.  Notes that the med has helped her to \"change the way I eat and the amount I eat, not necessarily what I eat\".  But she has been working to shift to healthier food choices also, in Sept the family made a plan to stop FF.  She has been able to stick with that.  Less fatigue post stopping the FF--sleep now is better on topiramate.    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    Current food issues/barriers to wt loss--\"still like the sweets\" but they don't taste as good as they used to,  Still wants to work on making healthier food choices (I enjoy \"bad food\"),  Lunch with co-workers is \"my biggest downfall\" although eating salad more now and not putting dressing on it now  Has been able to stop the pop  Still baking cookies    CURRENT WEIGHT:   278 lbs 3.53 oz    Wt Readings from Last 4 Encounters:   18 126.2 kg (278 lb 3.5 oz)   17 127.1 kg (280 lb 3.3 oz)   17 128 kg (282 lb 3 oz)   10/27/17 129 kg (284 lb 6.3 oz)       Height:  5' 3.583\"  Body Mass Index:  Body mass index is 48.38 kg/(m^2).  Vitals:  B/P: 131/66, P: 52, R: Data Unavailable     Initial consult weight was 284 on 10/27/17.  Weight change since last seen on 10/27/2017 " is down 6 pounds.   Total loss is 6 pounds.    Diet and Activity Changes Since Last Visit Reviewed With Patient 1/26/2018   I have made the following changes to my diet since my last visit: medication and diet change   With regards to my diet, I am still struggling with: healthy foods   For breakfast, I typically eat: nothing   For lunch, I typically eat: out will skip fried foods no fast food   For supper, I typically eat:  healthier meal chicken rice veg   For snack(s), I typically eat: sweets   I have made the following changes to my activity/exercise since my last visit: walking more   With regards to my activity/exercise, I am still struggling with: making it more a priority       ROS   Answers for HPI/ROS submitted by the patient on 1/26/2018   General Symptoms: Yes  Skin Symptoms: No  HENT Symptoms: Yes  EYE SYMPTOMS: No  HEART SYMPTOMS: No  LUNG SYMPTOMS: No  INTESTINAL SYMPTOMS: No  URINARY SYMPTOMS: No  GYNECOLOGIC SYMPTOMS: No  BREAST SYMPTOMS: No  SKELETAL SYMPTOMS: No  BLOOD SYMPTOMS: Yes  NERVOUS SYSTEM SYMPTOMS: No  MENTAL HEALTH SYMPTOMS: No  Fever: Yes  Loss of appetite: No  Weight loss: No  Weight gain: No  Fatigue: No  Night sweats: Yes  Chills: No  Increased stress: No  Excessive hunger: No  Excessive thirst: No  Feeling hot or cold when others believe the temperature is normal: No  Loss of height: No  Post-operative complications: No  Surgical site pain: No  Hallucinations: No  Change in or Loss of Energy: No  Hyperactivity: No  Confusion: No  Ear pain: No  Ear discharge: No  Hearing loss: No  Tinnitus: No  Nosebleeds: No  Congestion: No  Sinus pain: No  Trouble swallowing: No   Voice hoarseness: No  Mouth sores: No  Sore throat: No  Tooth pain: No  Gum tenderness: No  Bleeding gums: No  Change in taste: Yes  Change in sense of smell: No  Dry mouth: No  Hearing aid used: No  Neck lump: No  Anemia: Yes  Swollen glands: No  Easy bleeding or bruising: No  Edema or swelling: No        MEDICATIONS:    Current Outpatient Prescriptions   Medication     LORAZEPAM PO     topiramate (TOPAMAX) 25 MG tablet     No current facility-administered medications for this visit.        Weight Loss Medication History Reviewed With Patient 1/26/2018   Which weight loss medications are you currently taking on a regular basis?  Topamax (topiramate)   Are you having any side effects from the weight loss medication that we have prescribed you? No       ASSESSMENT:   Morbid obesity    PLAN:   Eat breakfast daily  Decrease portion sizes  Decrease eating out  Purge house of food triggers  No meal skipping  Volumetrics eating plan  Low Calorie/low fat diet--about 500 Calories below REE  Continue current pharm support--options in the future for further support increase increase topiramate as long as pt remains free of side effects, or potentially adding 1/2 of an adipex as long as no contraindications/CVD status remains OK        FOLLOW-UP:    Return in 1-2 months    Time: 20/25 min spent on evaluation, management, counseling, education, & motivational interviewing with greater than 50 % of the total time was spent on counseling and coordinating care    Sincerely,    Adria Shore MD

## 2018-01-26 NOTE — MR AVS SNAPSHOT
MRN:[id number]                      After Visit Summary   2018    Sabrina Arguello    MRN: [id number]           Visit Information        Provider Department      2018 10:30 AM Sherron Lopez RD Peds Weight Management        MyChart Information     CopperGate Communications[employer] is an electronic gateway that provides easy, online access to your medical records. With [employer], you can request a clinic appointment, read your test results, renew a prescription or communicate with your care team.     To sign up for [employer] visit the website at [email or web address]   You will be asked to enter the access code listed below, as well as some personal information. Please follow the directions to create your username and password.     Your access code is: [email or web address]  Expires: 2018  2:23 PM     Your access code will  in 90 days. If you need help or a new code, please contact your [facility] or call [phone] for assistance.        Care EveryWhere ID     This is your Care EveryWhere ID. This could be used by other organizations to access your [place] medical records  [id number]        Equal Access to Services     RACHEL ELLINGTON : Hadkayden Braxton, sully simmons, jayro hernandes, luis barba. So [facility] [phone].    ATENCIÓN: Si habla español, tiene a pace disposición servicios gratuitos de asistencia lingüística. Llame al [phone].    We comply with applicable federal civil rights laws and [place] laws. We do not discriminate on the basis of race, color, national origin, age, disability, sex, sexual orientation, or gender identity.

## 2018-01-26 NOTE — LETTER
"2018       RE: Sabrina Arguello  [address]  [facility] [address]     Dear Colleague,    Thank you for referring your patient, Sabrina Arguello, to the [facility] ADULT WEIGHT [facility]GT D at [facility]. Please see a copy of my visit note below.      Return Medical Weight Management Note     Sabrina Arguello  MRN:  [id number]  :  1970  CLARI:  2018    Dear Lidia Grissom,    I had the pleasure of seeing your patient Sabrina Arguello.  She is a 47 year old female who I am continuing to see for treatment of obesity related to:  Anxiety  Lower ext edema    INTERVAL HISTORY:  She is on topiramate and tolerating this without any side effects; she titrated up to 75 mg daily and does wonder more recently if the effect might be waning but is still getting good support.  Notes that the med has helped her to \"change the way I eat and the amount I eat, not necessarily what I eat\".  But she has been working to shift to healthier food choices also, in Sept the family made a plan to stop FF.  She has been able to stick with that.  Less fatigue post stopping the FF--sleep now is better on topiramate.    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    Current food issues/barriers to wt loss--\"still like the sweets\" but they don't taste as good as they used to,  Still wants to work on making healthier food choices (I enjoy \"bad food\"),  Lunch with co-workers is \"my biggest downfall\" although eating salad more now and not putting dressing on it now  Has been able to stop the pop  Still baking cookies    CURRENT WEIGHT:   278 lbs 3.53 oz    Wt Readings from Last 4 Encounters:   18 126.2 kg (278 lb 3.5 oz)   17 127.1 kg (280 lb 3.3 " "oz)   11/29/17 128 kg (282 lb 3 oz)   10/27/17 129 kg (284 lb 6.3 oz)       Height:  5' 3.583\"  Body Mass Index:  Body mass index is 48.38 kg/(m^2).  Vitals:  B/P: 131/66, P: 52, R: Data Unavailable     Initial consult weight was 284 on 10/27/17.  Weight change since last seen on 10/27/2017 is down 6 pounds.   Total loss is 6 pounds.    Diet and Activity Changes Since Last Visit Reviewed With Patient 1/26/2018   I have made the following changes to my diet since my last visit: medication and diet change   With regards to my diet, I am still struggling with: healthy foods   For breakfast, I typically eat: nothing   For lunch, I typically eat: out will skip fried foods no fast food   For supper, I typically eat:  healthier meal chicken rice veg   For snack(s), I typically eat: sweets   I have made the following changes to my activity/exercise since my last visit: walking more   With regards to my activity/exercise, I am still struggling with: making it more a priority       ROS   Answers for HPI/ROS submitted by the patient on 1/26/2018   General Symptoms: Yes  Skin Symptoms: No  HENT Symptoms: Yes  EYE SYMPTOMS: No  HEART SYMPTOMS: No  LUNG SYMPTOMS: No  INTESTINAL SYMPTOMS: No  URINARY SYMPTOMS: No  GYNECOLOGIC SYMPTOMS: No  BREAST SYMPTOMS: No  SKELETAL SYMPTOMS: No  BLOOD SYMPTOMS: Yes  NERVOUS SYSTEM SYMPTOMS: No  MENTAL HEALTH SYMPTOMS: No  Fever: Yes  Loss of appetite: No  Weight loss: No  Weight gain: No  Fatigue: No  Night sweats: Yes  Chills: No  Increased stress: No  Excessive hunger: No  Excessive thirst: No  Feeling hot or cold when others believe the temperature is normal: No  Loss of height: No  Post-operative complications: No  Surgical site pain: No  Hallucinations: No  Change in or Loss of Energy: No  Hyperactivity: No  Confusion: No  Ear pain: No  Ear discharge: No  Hearing loss: No  Tinnitus: No  Nosebleeds: No  Congestion: No  Sinus pain: No  Trouble swallowing: No   Voice hoarseness: No  Mouth " sores: No  Sore throat: No  Tooth pain: No  Gum tenderness: No  Bleeding gums: No  Change in taste: Yes  Change in sense of smell: No  Dry mouth: No  Hearing aid used: No  Neck lump: No  Anemia: Yes  Swollen glands: No  Easy bleeding or bruising: No  Edema or swelling: No        MEDICATIONS:   Current Outpatient Prescriptions   Medication     LORAZEPAM PO     topiramate (TOPAMAX) 25 MG tablet     No current facility-administered medications for this visit.        Weight Loss Medication History Reviewed With Patient 1/26/2018   Which weight loss medications are you currently taking on a regular basis?  Topamax (topiramate)   Are you having any side effects from the weight loss medication that we have prescribed you? No       ASSESSMENT:   Morbid obesity    PLAN:   Eat breakfast daily  Decrease portion sizes  Decrease eating out  Purge house of food triggers  No meal skipping  Volumetrics eating plan  Low Calorie/low fat diet--about 500 Calories below REE  Continue current pharm support--options in the future for further support increase increase topiramate as long as pt remains free of side effects, or potentially adding 1/2 of an adipex as long as no contraindications/CVD status remains OK        FOLLOW-UP:    Return in 1-2 months    Time: 20/25 min spent on evaluation, management, counseling, education, & motivational interviewing with greater than 50 % of the total time was spent on counseling and coordinating care    Sincerely,    Adria Shore MD

## 2018-01-26 NOTE — LETTER
"  1/26/2018      RE: Sabrina Arguello  [address]  [facility] [address]       Medical Nutrition Therapy  Nutrition Reassessment  Patient seen in Family/Pediatric Weight Mangement Clinic, accompanied by daughter.    Anthropometrics  Age:  47 year old female   Height: 161.5 cm (5' 3.58\")  Weight:  126.2 kg (278 lb 3.5 oz)  BMI:  48.49  Weight Loss 2 lbs since last clinic visit on 12/22/17.  Nutrition History  Patient seen in [facility] for family weight management follow up. Patient has been able to lose about 2 lbs in the past month. She admits that she off track over the holidays and still needs to get back to the changes she was doing previously. They have been able to keep fast food at a minimal but have been going to sit down restaurants more lately. Patient feels she is doing pretty well still and no struggles. She might have a craving for sweets - she will bake cookies but states she only does this 1 time in every 3 weeks. There were a few other incidents named in clinic - gets sugar free pudding but adds whip cream on top or family had root beer floats last Friday night. She does feel the topiramate has been working well for her - taste buds are still changed and appetite down.      Medications/Vitamins/Minerals    Current Outpatient Prescriptions:      topiramate (TOPAMAX) 25 MG tablet, 75 mg daily at supper, Disp: 270 tablet, Rfl: 0     LORAZEPAM PO, Take by mouth as needed for anxiety, Disp: , Rfl:      [DISCONTINUED] topiramate (TOPAMAX) 25 MG tablet, 25 mg at bedtime for 1 week, 50 mg at bedtime for 1 week and 75 mg daily at bedtime thereafter, Disp: 90 tablet, Rfl: 3    Previous Goals & Progress  1) Reduce BMI - ongoing goal (lost 2 lb)  2) Continue to eat regularly throughout the day - ongoing goal   3) Continue to decrease portion sizes  - ongoing goal     Nutrition Diagnosis  Obesity related to excessive energy intake as evidenced by BMI/age >95th %ile    Interventions & " Education  Provided written and verbal education on the following:    Food record  Plate Method  Healthy snacks  Healthy beverages  Portion sizes  Increase fruit and vegetable intake    Reviewed previous nutrition goals and patient's progress since last appointment. Discussed patient's progress since staring the medication and how she could improve her results she is getting by looking more closely at her food choices. Used examples like adding Whipped cream to sugar free pudding or root beer floats on how things can add up quickly. Strongly encouraged mom to log her food intake to create more awareness of foods that she is eating. Answered nutrition-related questions that  pt had, and worked with her to set nutrition goals to work towards until next visit.    Goals  1) Reduce BMI  2) Food log daily  3) Decrease extra add-ons - like whip cream or treats  4) Continue to monitor portion sizes    Monitoring/Evaluation  Will continue to monitor progress towards goals and provide education in Family/Pediatric Weight Management.    Spent 30 minutes in consult with patient & daughter.      Sherron Lopez MS, RD, LD  Pager # [phone]

## 2018-02-23 DIAGNOSIS — E66.01 MORBID OBESITY (H): ICD-10-CM

## 2018-02-23 RX ORDER — TOPIRAMATE 25 MG/1
TABLET, FILM COATED ORAL
Qty: 360 TABLET | Refills: 0 | Status: SHIPPED | OUTPATIENT
Start: 2018-02-23 | End: 2018-06-22

## 2018-02-23 NOTE — TELEPHONE ENCOUNTER
Sabrina needed refill on Topiramate.  She was wondering if Topiramate dose could be increased.  It is not working as well.  Sabrina is not experiencing any side effects from the medication.  Will discuss with Dr. Shore and call Sabrina back.    Discussed above with Dr. Shore.  Dr. Shore okay with increasing Topiramate to 75mg at supper and 25mg in the morning.  If Sabrina feels she needs more in the morning and is not experiencing drowsiness, she could take 50mg bid.      Called Sabrina and discussed increase in Topiramate.  Sabrina okay with plan.  She had no other questions at this time.  Encouraged her to call back with any questions or concerns.

## 2018-03-21 ENCOUNTER — TELEPHONE (OUTPATIENT)
Dept: PEDIATRICS | Facility: CLINIC | Age: 48
End: 2018-03-21

## 2018-03-21 NOTE — TELEPHONE ENCOUNTER
Called and left message re: Family Clinic appointments on 3/23/18 starting at 10am.  Left direct call back number for questions or concerns.

## 2018-03-23 ENCOUNTER — OFFICE VISIT (OUTPATIENT)
Dept: PSYCHOLOGY | Facility: CLINIC | Age: 48
End: 2018-03-23
Attending: PSYCHOLOGIST
Payer: COMMERCIAL

## 2018-03-23 ENCOUNTER — OFFICE VISIT (OUTPATIENT)
Dept: PEDIATRICS | Facility: CLINIC | Age: 48
End: 2018-03-23
Attending: DIETITIAN, REGISTERED
Payer: COMMERCIAL

## 2018-03-23 ENCOUNTER — OFFICE VISIT (OUTPATIENT)
Dept: ENDOCRINOLOGY | Facility: CLINIC | Age: 48
End: 2018-03-23
Attending: INTERNAL MEDICINE
Payer: COMMERCIAL

## 2018-03-23 VITALS
DIASTOLIC BLOOD PRESSURE: 92 MMHG | HEIGHT: 63 IN | SYSTOLIC BLOOD PRESSURE: 122 MMHG | BODY MASS INDEX: 48.79 KG/M2 | HEART RATE: 56 BPM | WEIGHT: 275.35 LBS

## 2018-03-23 DIAGNOSIS — E66.01 OBESITIES, MORBID (H): Primary | ICD-10-CM

## 2018-03-23 DIAGNOSIS — E66.01 MORBID OBESITY DUE TO EXCESS CALORIES (H): Primary | ICD-10-CM

## 2018-03-23 PROCEDURE — 97803 MED NUTRITION INDIV SUBSEQ: CPT | Performed by: DIETITIAN, REGISTERED

## 2018-03-23 PROCEDURE — G0463 HOSPITAL OUTPT CLINIC VISIT: HCPCS | Mod: ZF

## 2018-03-23 ASSESSMENT — PAIN SCALES - GENERAL: PAINLEVEL: NO PAIN (0)
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
## 2018-03-23 NOTE — LETTER
3/23/2018      RE: Sabrina Arguello  [address]  [facility] [address]       Pediatric Psychology Progress Note    Start time: 11;30am  Stop time: 11:45am  Service: [id number]  Diagnosis: obesity    Subjective: Sabrina is a 47 year old female seen in family weight management clinic with her daughter.     Objective: Spent the session discussing progress thus far with family weight management clinic. Sabrina was able to reflect on areas where positive changes had occurred (reduced unhealthy food, increased variety of foods in daughter's diet). The family noticed that not attending clinic made it harder to stay on track. They feel that the accountability of monthly appointments will be important for them going forward.     Assessment: Sabrina was open and honest in session about her own strengths and difficulties, as well as her family's. She seems motivated to continue on the path to healthier lifestyle.    Plan: Follow up in one month.    Chrissy Sandoval, PhD, LP, BCBA-D   of Pediatrics  Board Certified Behavior Analyst-Doctoral  Department of Pediatrics    *no letter      Chrissy Sandoval LP, PhD LP

## 2018-03-23 NOTE — MR AVS SNAPSHOT
MRN:[id number]                      After Visit Summary   3/23/2018    Sabrina Arguello    MRN: [id number]           Visit Information        Provider Department      3/23/2018 11:00 AM Sherron Lopez RD Peds Weight Management        [employer] Information     [employer] gives you secure access to your electronic health record. If you see a primary care provider, you can also send messages to your care team and make appointments. If you have questions, please call your primary care clinic.  If you do not have a primary care provider, please call [phone] and they will assist you.      [employer] is an electronic gateway that provides easy, online access to your medical records. With [employer], you can request a clinic appointment, read your test results, renew a prescription or communicate with your care team.     To access your existing account, please contact your [facility] or call [phone] for assistance.        Care EveryWhere ID     This is your Care EveryWhere ID. This could be used by other organizations to access your [place] medical records  [id number]        Equal Access to Services     RACHEL ELLINGTON : Hadii neo santacruz hadasho Sorai, waaxda luqadaha, qaybta kaalmada adejustine, luis barba. So [facility] [phone].    ATENCIÓN: Si habla español, tiene a pace disposición servicios gratuitos de asistencia lingüística. Llame al [phone].    We comply with applicable federal civil rights laws and [place] laws. We do not discriminate on the basis of race, color, national origin, age, disability, sex, sexual orientation, or gender identity.

## 2018-03-23 NOTE — MR AVS SNAPSHOT
"              After Visit Summary   3/23/2018    Sabrina Arguello    MRN: [id number]           Patient Information     Date Of Birth          1970        Visit Information        Provider Department      3/23/2018 10:00 AM Adria Shore MD [facility] Adult Weight Mgt D        Today's Diagnoses     Morbid obesity due to excess calories (H)    -  1       Follow-ups after your visit        Your next 10 appointments already scheduled     Mar 23, 2018 11:30 AM CDT   Return Visit with Chrissy Sandoval, PhD LP   Peds Psychology ([facility])    [facility]  [address]  [address] [facility] [address] [phone]              Who to contact     Please call your clinic at [phone] to:    Ask questions about your health    Make or cancel appointments    Discuss your medicines    Learn about your test results    Speak to your doctor            Additional Information About Your Visit        MyChart Information     [employer] gives you secure access to your electronic health record. If you see a primary care provider, you can also send messages to your care team and make appointments. If you have questions, please call your primary care clinic.  If you do not have a primary care provider, please call [phone] and they will assist you.      [employer] is an electronic gateway that provides easy, online access to your medical records. With [employer], you can request a clinic appointment, read your test results, renew a prescription or communicate with your care team.     To access your existing account, please contact your [facility] or call [phone] for assistance.        Care EveryWhere ID     This is your Care EveryWhere ID. This could be used by other organizations to access your [place] medical records  [id number]        Your Vitals Were     Pulse Height BMI (Body Mass Index)             56 1.612 m (5' 3.47\") 48.06 kg/m2          Blood " Pressure from Last 3 Encounters:   03/23/18 (!) 122/92   01/26/18 131/66   10/27/17 132/84    Weight from Last 3 Encounters:   03/23/18 124.9 kg (275 lb 5.7 oz)   01/26/18 126.2 kg (278 lb 3.5 oz)   12/22/17 127.1 kg (280 lb 3.3 oz)              Today, you had the following     No orders found for display       Primary Care Provider Office Phone # Fax #    Lidia Grissom [phone]       [place] [id number]  [facility] [id number]        Equal Access to Services     [facility] : Hadii neo Braxton, sully simmons, jayro kaalmamattie hernandes, luis barba. So [facility] [phone].    ATENCIÓN: Si habla español, tiene a pace disposición servicios gratuitos de asistencia lingüística. [facility] [phone].    We comply with applicable federal civil rights laws and [place] laws. We do not discriminate on the basis of race, color, national origin, age, disability, sex, sexual orientation, or gender identity.            Thank you!     Thank you for choosing [facility] ADULT WEIGHT MGT D  for your care. Our goal is always to provide you with excellent care. Hearing back from our patients is one way we can continue to improve our services. Please take a few minutes to complete the written survey that you may receive in the mail after your visit with us. Thank you!             Your Updated Medication List - Protect others around you: Learn how to safely use, store and throw away your medicines at www.disposemymeds.org.          This list is accurate as of 3/23/18 11:23 AM.  Always use your most recent med list.                   Brand Name Dispense Instructions for use Diagnosis    LORAZEPAM PO      Take by mouth as needed for anxiety        topiramate 25 MG tablet    TOPAMAX    360 tablet    25 mg every morning and 75 mg daily at supper    Morbid obesity (H)

## 2018-03-23 NOTE — LETTER
Date:April 16, 2018      Provider requested that no letter be sent. Do not send.       [facility] Health Information

## 2018-03-23 NOTE — LETTER
"  3/23/2018      RE: Sabrina Arguello  [address]  [facility] [address]       Medical Nutrition Therapy  Nutrition Reassessment  Patient seen in [facility], accompanied by daughter.    Anthropometrics  Age:  47 year old female   Height:  161.2 cm (5' 3.47\")  Weight:  124.9 kg (275 lb 5.7 oz)  BMI:  48.06  Weight Loss 2 lbs since last clinic visit on 1/26/18.  Nutrition History  Patient seen in [facility] for family weight management follow up. Patient reports having difficulty with making good choices with meal preps for wt loss--some higher fat and higher carb foods being made when they cook.  Gave up sugared soda but has added honey to her tea--will change to 0 calorie for sweetening the tea.   Topiramate is helping--has not yet added th AM dose of 25 mg and is getting supper, notes she is eating \"bad\" food choices.  Interested in, especially when scheduling healthy meal prep is an issue, jimena replacement.  Sometimes they do eat healthy frozen dinner meat replacements.    Medications/Vitamins/Minerals    Current Outpatient Prescriptions:      topiramate (TOPAMAX) 25 MG tablet, 25 mg every morning and 75 mg daily at supper, Disp: 360 tablet, Rfl: 0     LORAZEPAM PO, Take by mouth as needed for anxiety, Disp: , Rfl:     Previous Goals & Progress  1) Reduce BMI - ongoing goal (lost 2 1/2 lb)  2) Food log daily - goal not met  3) Decrease extra add-ons - like whip cream or treats - ongoing goal   4) Continue to monitor portion sizes - ongoing goal     Nutrition Diagnosis  Obesity related to excessive energy intake as evidenced by BMI of 48.06    Interventions & Education  Provided written and verbal education on the following:    Meal Plan  Meal replacements    Reviewed previous nutrition goals and patient's progress since last appointment. Patient was referred to a dietitian to discussed the start of meal replacement plan. Introduced patient to a  1200 calorie meal replacement " plan to better illustrate appropriate portion sizes/meal sizes. Discussed what food products would be included and how to incorporate into daily routine.     Goals  1) Reduce BMI  2) Follow 1200 meal replacement plan   3) Physical activity 4 days of the week with daughter - starting after vacation     Monitoring/Evaluation  Will continue to monitor progress towards goals and provide education in Family Weight Management.    Spent 30 minutes in consult with patient & daughter.        Sherron Lopez MS, RD, LD  Pager # [phone]

## 2018-03-23 NOTE — NURSING NOTE
"Chief Complaint   Patient presents with     RECHECK     follow up       Initial BP (!) 122/92  Pulse 56  Ht 5' 3.47\" (161.2 cm)  Wt 275 lb 5.7 oz (124.9 kg)  BMI 48.06 kg/m2 Estimated body mass index is 48.06 kg/(m^2) as calculated from the following:    Height as of this encounter: 5' 3.47\" (161.2 cm).    Weight as of this encounter: 275 lb 5.7 oz (124.9 kg).  Medication Reconciliation: complete     Carlos Manuel Manudjano LPN      "

## 2018-03-23 NOTE — MR AVS SNAPSHOT
After Visit Summary   3/23/2018    Sabrina Arguello    MRN: [id number]           Patient Information     Date Of Birth          1970        Visit Information        Provider Department      3/23/2018 11:30 AM Chrissy Sandoval, PhD LP Peds Psychology        Today's Diagnoses     Obesities, morbid (H)    -  1       Follow-ups after your visit        Your next 10 appointments already scheduled     Apr 27, 2018 11:00 AM CDT   Return Weight Management Visit with Adria Shore MD   [facility] Adult Weight Mgt D ([facility])    [address] [place] [address] 3rd Floor  [facility] [id number] [phone]            Apr 27, 2018 12:00 PM CDT   Return Visit with Sherron Lopez RD   Peds Weight Management ([facility])    [address]  [facility] [address] [id number] [phone]            Apr 27, 2018 12:45 PM CDT   Return Visit with Chrissy Sandoval, PhD MICHA   Peds Psychology ([facility])    [address], [facility]  [address] [place] [id number] [phone]              Who to contact     Please call your clinic at [phone] to:    Ask questions about your health    Make or cancel appointments    Discuss your medicines    Learn about your test results    Speak to your doctor            Additional Information About Your Visit        MyChart Information     [employer] gives you secure access to your electronic health record. If you see a primary care provider, you can also send messages to your care team and make appointments. If you have questions, please call your primary care clinic.  If you do not have a primary care provider, please call [phone] and they will assist you.      [employer] is an electronic gateway that provides easy, online access to your medical records. With [employer], you can request a clinic appointment, read your test results, renew a prescription or communicate with your care team.     To  access your existing account, please contact your [facility] or call [phone] for assistance.        Care EveryWhere ID     This is your Care EveryWhere ID. This could be used by other organizations to access your [place] medical records  [id number]         Blood Pressure from Last 3 Encounters:   03/23/18 (!) 122/92   01/26/18 131/66   10/27/17 132/84    Weight from Last 3 Encounters:   03/23/18 275 lb 5.7 oz (124.9 kg)   01/26/18 278 lb 3.5 oz (126.2 kg)   12/22/17 280 lb 3.3 oz (127.1 kg)              We Performed the Following     HEAL & BEHAV INTERV,EA 15 MIN,FAM W/PT        Primary Care Provider Office Phone # Fax #    Lidia Grissom [phone]       [address] [place]  [facility] [address]        Equal Access to Services     RACHEL ELLINGTON : Hadii aad ku hadasho Soomaali, waaxda luqadaha, qaybta kaalmada adeegyada, waxay taiin haykarleen marielle dias . So [facility] [phone].    ATENCIÓN: Si habla español, tiene a pace disposición servicios gratuitos de asistencia lingüística. Llame al [phone].    We comply with applicable federal civil rights laws and [place] laws. We do not discriminate on the basis of race, color, national origin, age, disability, sex, sexual orientation, or gender identity.            Thank you!     [facility] you for choosing [facility]  for your care. Our goal is always to provide you with excellent care. Hearing back from our patients is one way we can continue to improve our services. Please take a few minutes to complete the written survey that you may receive in the mail after your visit with us. Thank you!             Your Updated Medication List - Protect others around you: Learn how to safely use, store and throw away your medicines at www.disposemymeds.org.          This list is accurate as of 3/23/18 11:59 PM.  Always use your most recent med list.                   Brand Name Dispense  Instructions for use Diagnosis    LORAZEPAM PO      Take by mouth as needed for anxiety        topiramate 25 MG tablet    TOPAMAX    360 tablet    25 mg every morning and 75 mg daily at supper    Morbid obesity (H)

## 2018-03-24 ENCOUNTER — HEALTH MAINTENANCE LETTER (OUTPATIENT)
Age: 48
End: 2018-03-24

## 2018-04-02 ENCOUNTER — TELEPHONE (OUTPATIENT)
Dept: PEDIATRICS | Facility: CLINIC | Age: 48
End: 2018-04-02

## 2018-04-02 NOTE — TELEPHONE ENCOUNTER
Called and left message re: next family Clinic 4/27/18.  Scheduled start time of 11am.  Left call back number if date/time of follow up does not work.

## 2018-04-13 NOTE — PROGRESS NOTES
Pediatric Psychology Progress Note    Start time: 11;30am  Stop time: 11:45am  Service: [id number]  Diagnosis: obesity    Subjective: Sabrina is a 47 year old female seen in family weight management clinic with her daughter.     Objective: Spent the session discussing progress thus far with family weight management clinic. Sabrina was able to reflect on areas where positive changes had occurred (reduced unhealthy food, increased variety of foods in daughter's diet). The family noticed that not attending clinic made it harder to stay on track. They feel that the accountability of monthly appointments will be important for them going forward.     Assessment: Sabrina was open and honest in session about her own strengths and difficulties, as well as her family's. She seems motivated to continue on the path to healthier lifestyle.    Plan: Follow up in one month.    Chrissy Sandoval, PhD, LP, BCBA-D   of Pediatrics  Board Certified Behavior Analyst-Doctoral  Department of Pediatrics    *no letter

## 2018-04-23 ENCOUNTER — TELEPHONE (OUTPATIENT)
Dept: PEDIATRICS | Facility: CLINIC | Age: 48
End: 2018-04-23

## 2018-04-23 NOTE — TELEPHONE ENCOUNTER
Called and left message re: reminder of Family Clinic appointments starting at 11am.  Left direct call back number for questions or concerns.

## 2018-06-13 ENCOUNTER — TELEPHONE (OUTPATIENT)
Dept: PEDIATRICS | Facility: CLINIC | Age: 48
End: 2018-06-13

## 2018-06-13 NOTE — TELEPHONE ENCOUNTER
Called and left message re: reminder of Family Clinic appointments on 6/22/18 starting at 9am.  Went over providers they would be seeing.  Left direct call back number for questions or concerns.

## 2018-06-22 ENCOUNTER — OFFICE VISIT (OUTPATIENT)
Dept: ENDOCRINOLOGY | Facility: CLINIC | Age: 48
End: 2018-06-22
Attending: INTERNAL MEDICINE
Payer: COMMERCIAL

## 2018-06-22 VITALS
SYSTOLIC BLOOD PRESSURE: 140 MMHG | WEIGHT: 272.05 LBS | BODY MASS INDEX: 48.2 KG/M2 | DIASTOLIC BLOOD PRESSURE: 80 MMHG | HEIGHT: 63 IN | HEART RATE: 55 BPM

## 2018-06-22 DIAGNOSIS — R73.03 PREDIABETES: ICD-10-CM

## 2018-06-22 DIAGNOSIS — E66.01 MORBID OBESITY (H): Primary | ICD-10-CM

## 2018-06-22 LAB — HBA1C MFR BLD: 6.4 % (ref 0–5.6)

## 2018-06-22 PROCEDURE — 36415 COLL VENOUS BLD VENIPUNCTURE: CPT | Performed by: INTERNAL MEDICINE

## 2018-06-22 PROCEDURE — 83036 HEMOGLOBIN GLYCOSYLATED A1C: CPT | Performed by: INTERNAL MEDICINE

## 2018-06-22 PROCEDURE — G0463 HOSPITAL OUTPT CLINIC VISIT: HCPCS | Mod: ZF

## 2018-06-22 RX ORDER — TOPIRAMATE 25 MG/1
TABLET, FILM COATED ORAL
Qty: 360 TABLET | Refills: 0 | Status: SHIPPED | OUTPATIENT
Start: 2018-06-22 | End: 2018-08-24

## 2018-06-22 ASSESSMENT — PAIN SCALES - GENERAL: PAINLEVEL: NO PAIN (0)

## 2018-06-22 NOTE — LETTER
Patient:  Sabrina Arguello  :   1970  MRN:     [id number]        Ms.Naomi Radha Arguello  [address] [facility] [address]        2018    Dear ,    We are writing to inform you of your test results.  This sugar result (hemoglobin A1c which reflects about 2-3 months of average blood sugar control) is similar to the last check we have in our system, and in the pre-diabetes range.  Getting weight loss (even small amounts such as 5%) will help to improve your health.  Please let us know if you have any questions or concerns between visits.      Resulted Orders   Hemoglobin A1c   Result Value Ref Range    Hemoglobin A1C 6.4 (H) 0 - 5.6 %      Comment:      Normal <5.7% Prediabetes 5.7-6.4%  Diabetes 6.5% or higher - adopted from ADA   consensus guidelines.         Adria Shore MD

## 2018-06-22 NOTE — PATIENT INSTRUCTIONS
Goal to stop ETOH    Walking at least several times per week    For restaurant food (which happens because of needed travel over the summer)--to-go box beginning of the meal for 1/2 of the meal    Cut back on fat and high carb
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
## 2018-06-22 NOTE — NURSING NOTE
"[facility] [[id number]]  Chief Complaint   Patient presents with     RECHECK     weight management     Initial /80 (BP Location: Right arm, Patient Position: Sitting, Cuff Size: Adult Large)  Pulse 55  Ht 5' 3.43\" (161.1 cm)  Wt 272 lb 0.8 oz (123.4 kg)  BMI 47.55 kg/m2 Estimated body mass index is 47.55 kg/(m^2) as calculated from the following:    Height as of this encounter: 5' 3.43\" (161.1 cm).    Weight as of this encounter: 272 lb 0.8 oz (123.4 kg).  Medication Reconciliation: complete   Caity Lamar LPN      "
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
## 2018-08-20 ENCOUNTER — TELEPHONE (OUTPATIENT)
Dept: PEDIATRICS | Facility: CLINIC | Age: 48
End: 2018-08-20

## 2018-08-20 NOTE — TELEPHONE ENCOUNTER
Called and left message re: reminder of Family Clinic appointments on 8/24/18 starting at 9:30am.  Went over what providers they would be seeing.  Left direct call back number.

## 2018-08-24 ENCOUNTER — OFFICE VISIT (OUTPATIENT)
Dept: ENDOCRINOLOGY | Facility: CLINIC | Age: 48
End: 2018-08-24
Attending: INTERNAL MEDICINE
Payer: COMMERCIAL

## 2018-08-24 ENCOUNTER — OFFICE VISIT (OUTPATIENT)
Dept: PEDIATRICS | Facility: CLINIC | Age: 48
End: 2018-08-24
Attending: DIETITIAN, REGISTERED
Payer: COMMERCIAL

## 2018-08-24 VITALS
BODY MASS INDEX: 46.76 KG/M2 | SYSTOLIC BLOOD PRESSURE: 120 MMHG | HEIGHT: 63 IN | HEART RATE: 47 BPM | DIASTOLIC BLOOD PRESSURE: 66 MMHG | WEIGHT: 263.89 LBS

## 2018-08-24 DIAGNOSIS — R73.03 PREDIABETES: Primary | ICD-10-CM

## 2018-08-24 DIAGNOSIS — E66.01 MORBID OBESITY DUE TO EXCESS CALORIES (H): ICD-10-CM

## 2018-08-24 PROCEDURE — 97803 MED NUTRITION INDIV SUBSEQ: CPT | Mod: 59 | Performed by: DIETITIAN, REGISTERED

## 2018-08-24 PROCEDURE — G0463 HOSPITAL OUTPT CLINIC VISIT: HCPCS | Mod: ZF

## 2018-08-24 RX ORDER — TOPIRAMATE 25 MG/1
TABLET, FILM COATED ORAL
Qty: 360 TABLET | Refills: 0 | Status: SHIPPED | OUTPATIENT
Start: 2018-08-24 | End: 2018-12-26

## 2018-08-24 ASSESSMENT — PAIN SCALES - GENERAL: PAINLEVEL: NO PAIN (0)

## 2018-08-24 NOTE — PROGRESS NOTES
"        Return Medical Weight Management Note     Sabrina Arguello  MRN:  [id number]  :  1970  CLARI:  2018    Dear Lidia Grissom,    I had the pleasure of seeing your patient Sabrina Arguello.  She is a 47 year old female who I am continuing to see for treatment of obesity related to:  Prediabetes      INTERVAL HISTORY:  She returns, last seen about 2 mo ago; reviewed and relevant history as extracted from earlier note is as follows--  Reviewed and relevant history, as extracted from earlier notes, is as follows--  \"I had the pleasure of seeing your patient, Sabrina Arguello.  Full intake/assessment done to determine barriers to weight loss success and develop a treatment plan.  Sabrina Arguello is a 46 year old female interested in treatment of medical problems associated with weight.  Her weight today is 284 lbs 6.29 oz, Body mass index is 49.52 kg/(m^2)., and she has the following co-morbidities:  Anxiety  Lower ext edema  -----Of note from intake--had obesity since childhood  Further periods of wt gain were associated with the following--  Gained about 40-50# with pregnancies  About 4-5 yrs ago had a 50# wt gain with anxiety med\"      Since last seen she notes the following--  She has given up ETOH and largely bread/carbs  Walking more 88088 steps daily at least 5 times per wk  There is less eating out in general--working to avoid it    With the changes she has made she is losing wt, she feels good about it, and motivated to continue.  Current pharm support is good and she is taking her med as prescribed--25mg AM and 75mg PM      CURRENT WEIGHT:   263 lbs 14.25 oz    Wt Readings from Last 4 Encounters:   18 119.7 kg (263 lb 14.3 oz)   18 123.4 kg (272 lb 0.8 oz)   18 124.9 kg (275 lb 5.7 oz)   18 126.2 kg (278 lb 3.5 oz)       Height:  5' 2.953\"  Body Mass Index:  Body mass index is 46.82 kg/(m^2).  Vitals:  B/P: 120/66, P: 47, R: Data Unavailable "     Initial consult weight was 284 on 10/27/17.  Weight change since last seen on 6/22/2018 is down 8 pounds.   Total loss is 21 pounds.    Diet and Activity Changes Since Last Visit Reviewed With Patient 1/26/2018   I have made the following changes to my diet since my last visit: medication and diet change   With regards to my diet, I am still struggling with: healthy foods   For breakfast, I typically eat: nothing   For lunch, I typically eat: out will skip fried foods no fast food   For supper, I typically eat:  healthier meal chicken rice veg   For snack(s), I typically eat: sweets   I have made the following changes to my activity/exercise since my last visit: walking more   With regards to my activity/exercise, I am still struggling with: making it more a priority       MEDICATIONS:   Current Outpatient Prescriptions   Medication     LORAZEPAM PO     topiramate (TOPAMAX) 25 MG tablet     No current facility-administered medications for this visit.        Weight Loss Medication History Reviewed With Patient 1/26/2018   Which weight loss medications are you currently taking on a regular basis?  Topamax (topiramate)   Are you having any side effects from the weight loss medication that we have prescribed you? No       ASSESSMENT:   Morbid obesity  Prediabetes     PLAN:   Decrease portion sizes  Purge house of food triggers  Decrease caloric beverages by stopping ETOH  Dietician visit of education  Volumetrics eating plan  Calorie/low fat diet  Meal planning  Increase activity as below     Additionally--  -Walking at least several times per week  -For restaurant food (which happens because of needed travel over the summer)--to-go box beginning of the meal for 1/2 of the meal  -Cut back on fat and high carb meals    FOLLOW-UP:    4 wks    Time: about 20/20 min spent on evaluation, management, counseling, education, & motivational interviewing with greater than 50 % of the total time was spent on counseling and  coordinating care    Sincerely,    Adria Shore MD

## 2018-08-24 NOTE — MR AVS SNAPSHOT
After Visit Summary   8/24/2018    Sabrina Arguello    MRN: [id number]           Patient Information     Date Of Birth          1970        Visit Information        Provider Department      8/24/2018 9:30 AM Adria Shore MD [facility] Adult Weight Mgt D        Care Instructions    We can continue with the currently daily dose of topiramate--you are taking this in the evenings    Other goals remain--  Decrease portion sizes  Purge house of food triggers  Decrease caloric beverages by stopping alcohol  Volumetrics eating plan  Calorie/low fat diet  Meal planning  Increase activity as below     Additionally--  -Walking at least several times per week  -For restaurant food (which happens because of needed travel over the summer)--to-go box for 1/2 of the meal  -Cut back on fat and high carb meals      We can plan labs at next visit to follow up on blood sugars; you are doing great!            Follow-ups after your visit        Follow-up notes from your care team     Return in about 1 month (around 9/24/2018).      Who to contact     Please call your clinic at [phone] to:    Ask questions about your health    Make or cancel appointments    Discuss your medicines    Learn about your test results    Speak to your doctor            Additional Information About Your Visit        POS on [employer] Information     [employer] gives you secure access to your electronic health record. If you see a primary care provider, you can also send messages to your care team and make appointments. If you have questions, please call your primary care clinic.  If you do not have a primary care provider, please call [phone] and they will assist you.      [employer] is an electronic gateway that provides easy, online access to your medical records. With [employer], you can request a clinic appointment, read your test results, renew a prescription or communicate with your care team.     To access your existing account,  "please contact your [facility] or call [phone] for assistance.        Care EveryWhere ID     This is your Care EveryWhere ID. This could be used by other organizations to access your [facility] medical records  [id number]        Your Vitals Were     Pulse Height BMI (Body Mass Index)             47 1.599 m (5' 2.95\") 46.82 kg/m2          Blood Pressure from Last 3 Encounters:   08/24/18 120/66   06/22/18 140/80   03/23/18 (!) 122/92    Weight from Last 3 Encounters:   08/24/18 119.7 kg (263 lb 14.3 oz)   06/22/18 123.4 kg (272 lb 0.8 oz)   03/23/18 124.9 kg (275 lb 5.7 oz)              Today, you had the following     No orders found for display       Primary Care Provider Office Phone # Fax #    Lidia Grissom [phone]       [address] [place]  [facility] [address]        Equal Access to Services     RACHEL ELLINGTON : Hadii aad ku hadasho Soomaali, waaxda luqadaha, qaybta kaalmada adeegyada, waxay taiin haycampbell dias . So [facility] [phone].    ATENCIÓN: Si habla español, tiene a pace disposición servicios gratuitos de asistencia lingüística. Llame al [phone].    We comply with applicable federal civil rights laws and [place] laws. We do not discriminate on the basis of race, color, national origin, age, disability, sex, sexual orientation, or gender identity.            Thank you!     Thank you for choosing [facility] ADULT WEIGHT MGT D  for your care. Our goal is always to provide you with excellent care. Hearing back from our patients is one way we can continue to improve our services. Please take a few minutes to complete the written survey that you may receive in the mail after your visit with us. Thank you!             Your Updated Medication List - Protect others around you: Learn how to safely use, store and throw away your medicines at www.disposemymeds.org.          This list is accurate as of 8/24/18 10:12 AM.  " Always use your most recent med list.                   Brand Name Dispense Instructions for use Diagnosis    LORAZEPAM PO      Take by mouth as needed for anxiety        topiramate 25 MG tablet    TOPAMAX    360 tablet    25 mg every morning and 75 mg daily at supper    Morbid obesity (H)

## 2018-08-24 NOTE — PATIENT INSTRUCTIONS
We can continue with the currently daily dose of topiramate--you are taking this in the evenings    Other goals remain--  Decrease portion sizes  Purge house of food triggers  Decrease caloric beverages by stopping alcohol  Volumetrics eating plan  Calorie/low fat diet  Meal planning  Increase activity as below     Additionally--  -Walking at least several times per week  -For restaurant food (which happens because of needed travel over the summer)--to-go box for 1/2 of the meal  -Cut back on fat and high carb meals      We can plan labs at next visit to follow up on blood sugars; you are doing great!

## 2018-08-24 NOTE — MR AVS SNAPSHOT
MRN:[id number]                      After Visit Summary   8/24/2018    Sabrina Arguello    MRN: [id number]           Visit Information        Provider Department      8/24/2018 10:00 AM Sherron Lopez RD Peds Weight Management        [employer] Information     [employer] gives you secure access to your electronic health record. If you see a primary care provider, you can also send messages to your care team and make appointments. If you have questions, please call your primary care clinic.  If you do not have a primary care provider, please call [phone] and they will assist you.      [employer] is an electronic gateway that provides easy, online access to your medical records. With [employer], you can request a clinic appointment, read your test results, renew a prescription or communicate with your care team.     To access your existing account, please contact your [facility] or call [phone] for assistance.        Care EveryWhere ID     This is your Care EveryWhere ID. This could be used by other organizations to access your [facility] medical records  [id number]        Equal Access to Services     RACHEL ELLINGTON : Hadii neo santacruz hadasho Sorai, waaxda luqadaha, qaybta kaalmada adejustine, luis barba. So [facility] [phone].    ATENCIÓN: Si habla español, tiene a pace disposición servicios gratuitos de asistencia lingüística. Llame al [phone].    We comply with applicable federal civil rights laws and [place] laws. We do not discriminate on the basis of race, color, national origin, age, disability, sex, sexual orientation, or gender identity.

## 2018-08-24 NOTE — PROGRESS NOTES
"Medical Nutrition Therapy  Nutrition Reassessment  Patient  seen in Family/Pediatric Weight Mangement Clinic, accompanied by daughter.    Anthropometrics  Age:  47 year old female   Height: 159.9 cm (5' 2.95\")  Weight: 119.7 kg (263 lb 14.2 oz)  BMI:  46.91  Weight Loss 9 lbs since last clinic visit on 6/22/18.  Nutrition History  Patient seen in [facility] for family weight management follow up. Patient has lost about 9 lbs in the past 2 months. Patient states she is doing very well and has really worked to add some missing \"pieces\" to her lifestyle changes. She is walking up to 10,000 steps 5 times a week. She has been working hard to decrease eating out and eating more salads with protein. She continues to take topiramate and feels it has really alternated her eating - less hungry (helps to decrease her snacking), decrease cravings for beer and pop.       Medications/Vitamins/Minerals    Current Outpatient Prescriptions:      LORAZEPAM PO, Take by mouth as needed for anxiety, Disp: , Rfl:      topiramate (TOPAMAX) 25 MG tablet, 25 mg every morning and 75 mg daily at supper, Disp: 360 tablet, Rfl: 0     [DISCONTINUED] topiramate (TOPAMAX) 25 MG tablet, 25 mg every morning and 75 mg daily at supper, Disp: 360 tablet, Rfl: 0    Previous Goals & Progress  1) Reduce BMI - ongoing goal ; lost 9 lbs  2) Follow 1200 meal replacement plan - ongoing goal   3) Physical activity 4 days of the week with daughter - starting after vacation  - goal met    Nutrition Diagnosis  Obesity related to excessive energy intake as evidenced by BMI/age >95th %ile    Interventions & Education  Provided written and verbal education on the following:    Food record  Plate Method  Healthy snacks  Healthy beverages  Meal replacements  Portion sizes  Increase fruit and vegetable intake    Goals  1) Reduce BMI  2) Continue to monitor portion sizes  3) Continue to limit eating out   4) Great job with activity - keep it " up!    Monitoring/Evaluation  Will continue to monitor progress towards goals and provide education in Family/Pediatric Weight Management.    Spent 15 minutes in consult with patient & daughter.      Sherron Lopez MS, RD, LD  Pager # [phone]

## 2018-08-24 NOTE — LETTER
"  8/24/2018      RE: Sabrina Arguello  [address]  [facility] [address]       Medical Nutrition Therapy  Nutrition Reassessment  Patient  seen in Family[facility]Pediatric [facility] Clinic, accompanied by daughter.    Anthropometrics  Age:  47 year old female   Height: 159.9 cm (5' 2.95\")  Weight: 119.7 kg (263 lb 14.2 oz)  BMI:  46.91  Weight Loss 9 lbs since last clinic visit on 6/22/18.  Nutrition History  Patient seen in [facility] for family weight management follow up. Patient has lost about 9 lbs in the past 2 months. Patient states she is doing very well and has really worked to add some missing \"pieces\" to her lifestyle changes. She is walking up to 10,000 steps 5 times a week. She has been working hard to decrease eating out and eating more salads with protein. She continues to take topiramate and feels it has really alternated her eating - less hungry (helps to decrease her snacking), decrease cravings for beer and pop.       Medications/Vitamins/Minerals    Current Outpatient Prescriptions:      LORAZEPAM PO, Take by mouth as needed for anxiety, Disp: , Rfl:      topiramate (TOPAMAX) 25 MG tablet, 25 mg every morning and 75 mg daily at supper, Disp: 360 tablet, Rfl: 0     [DISCONTINUED] topiramate (TOPAMAX) 25 MG tablet, 25 mg every morning and 75 mg daily at supper, Disp: 360 tablet, Rfl: 0    Previous Goals & Progress  1) Reduce BMI - ongoing goal ; lost 9 lbs  2) Follow 1200 meal replacement plan - ongoing goal   3) Physical activity 4 days of the week with daughter - starting after vacation  - goal met    Nutrition Diagnosis  Obesity related to excessive energy intake as evidenced by BMI/age >95th %ile    Interventions & Education  Provided written and verbal education on the following:    Food record  Plate Method  Healthy snacks  Healthy beverages  Meal replacements  Portion sizes  Increase fruit and vegetable intake    Goals  1) Reduce BMI  2) Continue to monitor portion " sizes  3) Continue to limit eating out   4) Great job with activity - keep it up!    Monitoring/Evaluation  Will continue to monitor progress towards goals and provide education in Family/Pediatric Weight Management.    Spent 15 minutes in consult with patient & daughter.      Sherron Lopez MS, RD, LD  Pager # [phone]

## 2018-08-24 NOTE — NURSING NOTE
"[facility] [[id number]]  Chief Complaint   Patient presents with     RECHECK     weight management     Initial /66  Pulse (!) 47  Ht 5' 2.95\" (159.9 cm)  Wt 263 lb 14.3 oz (119.7 kg)  BMI 46.82 kg/m2 Estimated body mass index is 46.82 kg/(m^2) as calculated from the following:    Height as of this encounter: 5' 2.95\" (159.9 cm).    Weight as of this encounter: 263 lb 14.3 oz (119.7 kg).  Medication Reconciliation: complete   Caity Lamar LPN      "

## 2018-08-24 NOTE — LETTER
"2018       RE: Sabrina Arguello  [address]  [facility] [address]     Dear Colleague,    Thank you for referring your patient, Sabrina Arguello, to the [facility] ADULT WEIGHT MGT D at [facility]. Please see a copy of my visit note below.      Return Medical Weight Management Note     Sabrina Arguello  MRN:  [id number]  :  1970  CLARI:  2018    Dear Lidia Grissom,    I had the pleasure of seeing your patient Sabrina Arguello.  She is a 47 year old female who I am continuing to see for treatment of obesity related to:  Prediabetes      INTERVAL HISTORY:  She returns, last seen about 2 mo ago; reviewed and relevant history as extracted from earlier note is as follows--  Reviewed and relevant history, as extracted from earlier notes, is as follows--  \"I had the pleasure of seeing your patient, Sabrina Arguello.  Full intake/assessment done to determine barriers to weight loss success and develop a treatment plan.  Sabrina Arguello is a 46 year old female interested in treatment of medical problems associated with weight.  Her weight today is 284 lbs 6.29 oz, Body mass index is 49.52 kg/(m^2)., and she has the following co-morbidities:  Anxiety  Lower ext edema  -----Of note from intake--had obesity since childhood  Further periods of wt gain were associated with the following--  Gained about 40-50# with pregnancies  About 4-5 yrs ago had a 50# wt gain with anxiety med\"      Since last seen she notes the following--  She has given up ETOH and largely bread/carbs  Walking more 20952 steps daily at least 5 times per wk  There is less eating out in general--working to avoid it    With the changes she has made she is losing wt, she feels good about it, and motivated to continue.  Current pharm support is good and she is taking her med as prescribed--25mg AM and 75mg PM      CURRENT WEIGHT:   263 lbs 14.25 oz    Wt Readings from Last 4 " "Encounters:   08/24/18 119.7 kg (263 lb 14.3 oz)   06/22/18 123.4 kg (272 lb 0.8 oz)   03/23/18 124.9 kg (275 lb 5.7 oz)   01/26/18 126.2 kg (278 lb 3.5 oz)       Height:  5' 2.953\"  Body Mass Index:  Body mass index is 46.82 kg/(m^2).  Vitals:  B/P: 120/66, P: 47, R: Data Unavailable     Initial consult weight was 284 on 10/27/17.  Weight change since last seen on 6/22/2018 is down 8 pounds.   Total loss is 21 pounds.    Diet and Activity Changes Since Last Visit Reviewed With Patient 1/26/2018   I have made the following changes to my diet since my last visit: medication and diet change   With regards to my diet, I am still struggling with: healthy foods   For breakfast, I typically eat: nothing   For lunch, I typically eat: out will skip fried foods no fast food   For supper, I typically eat:  healthier meal chicken rice veg   For snack(s), I typically eat: sweets   I have made the following changes to my activity/exercise since my last visit: walking more   With regards to my activity/exercise, I am still struggling with: making it more a priority       MEDICATIONS:   Current Outpatient Prescriptions   Medication     LORAZEPAM PO     topiramate (TOPAMAX) 25 MG tablet     No current facility-administered medications for this visit.        Weight Loss Medication History Reviewed With Patient 1/26/2018   Which weight loss medications are you currently taking on a regular basis?  Topamax (topiramate)   Are you having any side effects from the weight loss medication that we have prescribed you? No       ASSESSMENT:   Morbid obesity  Prediabetes     PLAN:   Decrease portion sizes  Purge house of food triggers  Decrease caloric beverages by stopping ETOH  Dietician visit of education  Volumetrics eating plan  Calorie/low fat diet  Meal planning  Increase activity as below     Additionally--  -Walking at least several times per week  -For restaurant food (which happens because of needed travel over the summer)--to-go " box beginning of the meal for 1/2 of the meal  -Cut back on fat and high carb meals    FOLLOW-UP:    4 wks    Time: about 20/20 min spent on evaluation, management, counseling, education, & motivational interviewing with greater than 50 % of the total time was spent on counseling and coordinating care    Sincerely,    Adria Shore MD

## 2018-08-30 ENCOUNTER — TELEPHONE (OUTPATIENT)
Dept: ENDOCRINOLOGY | Facility: CLINIC | Age: 48
End: 2018-08-30

## 2018-08-30 NOTE — TELEPHONE ENCOUNTER
Called and spoke to Sabrina about Family Clinic on 9/28/18.  Scheduled her to see Dr. Shore at 8:30am.  Sabrina did not want to schedule with dietitian at this time.

## 2018-09-24 ENCOUNTER — TELEPHONE (OUTPATIENT)
Dept: ENDOCRINOLOGY | Facility: CLINIC | Age: 48
End: 2018-09-24

## 2018-09-24 NOTE — TELEPHONE ENCOUNTER
Called and left message re: reminder of appointment on 9/28/18 at 8:30am with Dr. Shore.  Left direct call back number for questions or concerns.

## 2018-12-20 DIAGNOSIS — E66.01 MORBID OBESITY DUE TO EXCESS CALORIES (H): ICD-10-CM

## 2018-12-22 RX ORDER — TOPIRAMATE 25 MG/1
TABLET, FILM COATED ORAL
Qty: 120 TABLET | Refills: 0 | OUTPATIENT
Start: 2018-12-22

## 2018-12-22 NOTE — TELEPHONE ENCOUNTER
Received refill request for   topiramate (TOPAMAX   . Patient needs appointment scheduled prior to any refills. Clinic Coordinator notified and will follow up with the patient as appropriate. The pharmacy has been notified that the medication will not be refilled prior to an appointment being scheduled.    Scheduling has been notified to contact the pt for appointment.

## 2018-12-26 DIAGNOSIS — E66.01 MORBID OBESITY DUE TO EXCESS CALORIES (H): ICD-10-CM

## 2018-12-31 RX ORDER — TOPIRAMATE 25 MG/1
TABLET, FILM COATED ORAL
Qty: 120 TABLET | Refills: 0 | Status: SHIPPED | OUTPATIENT
Start: 2018-12-31

## 2019-10-05 ENCOUNTER — HEALTH MAINTENANCE LETTER (OUTPATIENT)
Age: 49
End: 2019-10-05

## 2020-11-14 ENCOUNTER — HEALTH MAINTENANCE LETTER (OUTPATIENT)
Age: 50
End: 2020-11-14

## 2021-02-06 ENCOUNTER — HEALTH MAINTENANCE LETTER (OUTPATIENT)
Age: 51
End: 2021-02-06

## 2021-09-12 ENCOUNTER — HEALTH MAINTENANCE LETTER (OUTPATIENT)
Age: 51
End: 2021-09-12

## 2022-01-02 ENCOUNTER — HEALTH MAINTENANCE LETTER (OUTPATIENT)
Age: 52
End: 2022-01-02

## 2022-02-27 ENCOUNTER — HEALTH MAINTENANCE LETTER (OUTPATIENT)
Age: 52
End: 2022-02-27

## 2022-07-01 ENCOUNTER — NURSE TRIAGE (OUTPATIENT)
Dept: NURSING | Facility: CLINIC | Age: 52
End: 2022-07-01

## 2022-07-02 NOTE — TELEPHONE ENCOUNTER
Patient calling to report positive home Covid test after feeling symptoms today of fatigue, body aches, headache.  Feeling warm but does not have thermometer.  She will have someone drop one off.  Denies dyspnea, chest pain.      Gather patient reported symptoms   Assessment   Current Symptoms at time of phone call, reported by patient: (if no symptoms, document No symptoms] Headaches, body aches, fatigue, feels warm   Date of Symptom(s) onset (if applicable) 7-1-22     If at time of call, Patients symptoms hare worsened, the Patient should contact 911 or have someone drive them to Emergency Dept promptly:      If Patient calling 911, inform 911 personal that you have tested positive for the Coronavirus (COVID-19).  Place mask on and await 911 to arrive.    If Emergency Dept, If possible, please have another adult drive you to the Emergency Dept but you need to wear mask when in contact with other people.      Monoclonal Antibody Administration    You may be eligible to receive a new treatment with a monoclonal antibody for preventing hospitalization in patients at high risk for complications from COVID-19. This medication is still experimental and available on a limited basis; it is given through an IV and must be given at an infusion center. Please note that not all people who are eligible will receive the medication since it is in limited supply.  Is the patient symptomatic and onset of symptoms within the last 7 days?  Yes  Is the patient interested in a visit with a provider to discuss treatment options?: Yes  Is the patient seen at [facility]?  No: Warm transfer caller to [phone] to be scheduled with a virtual urgent provider.  During transfer, instruct  on appropriate time frame for visit     Review information with Patient    Your result was positive. This means you have COVID-19 (coronavirus).      How can I protect others?    These guidelines are for isolating before returning to  work, school or .       If you DO have symptoms:  o Stay home and away from others  - For at least 5 days after your symptoms started, AND   - You are fever free for 24 hours (with no medicine that reduces fever), AND  - Your other symptoms are better.  o Wear a mask for 10 full days any time you are around others.    If you DON'T have symptoms:  o Stay at home and away from others for at least 5 days after your positive test.  o Wear a mask for 10 full days any time you are around others.    There may be different guidelines for healthcare facilities. Please check with the specific sites before arriving.     You should not go back to work until you meet the guidelines above for ending your home isolation.   Would you like me to review some of that information with you now?  Yes    How can I take care of myself?      Get lots of rest. Drink extra fluids (unless a doctor has told you not to).      Take Tylenol (acetaminophen) for fever or pain. If you have liver or kidney problems, ask your family doctor if it's okay to take Tylenol.     Take either:     650 mg (two 325 mg pills) every 4 to 6 hours, or     1,000 mg (two 500 mg pills) every 8 hours as needed.     Note: Do not take more than 3,000 mg in one day. Acetaminophen is found in many medicines (both prescribed and over-the-counter medicines). Read all labels to be sure you don't take too much.    For children, check the Tylenol bottle for the right dose (based on their age or weight).      If you have other health problems (like cancer, heart failure, an organ transplant or severe kidney disease): Call your specialty clinic if you don't feel better in the next 2 days.      Know when to call 911: Emergency warning signs include:    Trouble breathing or shortness of breath    Pain or pressure in the chest that doesn't go away    Feeling confused like you haven't felt before, or not being able to wake up    Bluish-colored lips or face        Alayna  JUAN PABLO Jorge

## 2022-07-03 ENCOUNTER — VIRTUAL VISIT (OUTPATIENT)
Dept: URGENT CARE | Facility: CLINIC | Age: 52
End: 2022-07-03
Payer: COMMERCIAL

## 2022-07-03 DIAGNOSIS — U07.1 CLINICAL DIAGNOSIS OF COVID-19: Primary | ICD-10-CM

## 2022-07-03 PROCEDURE — 99203 OFFICE O/P NEW LOW 30 MIN: CPT | Mod: 95

## 2022-07-03 RX ORDER — HYDROCHLOROTHIAZIDE 12.5 MG/1
12.5 TABLET ORAL DAILY
Qty: 30 TABLET | Refills: 0 | COMMUNITY
Start: 2022-07-03

## 2022-07-03 RX ORDER — ATORVASTATIN CALCIUM 10 MG/1
10 TABLET, FILM COATED ORAL DAILY
Qty: 30 TABLET | Refills: 0 | COMMUNITY
Start: 2022-07-03

## 2022-07-03 RX ORDER — LISINOPRIL 10 MG/1
10 TABLET ORAL DAILY
Qty: 30 TABLET | Refills: 0 | COMMUNITY
Start: 2022-07-03

## 2022-07-03 NOTE — PROGRESS NOTES
"Sabrina is a 51 year old who is being evaluated via a billable phone visit.      Sx started 7/1 and tested + 7/1.  Fatigued.  Rhinorrhea.  Weak.  Dry cough.  COVID vaccinated and boosted.    Assessment & Plan     Clinical diagnosis of COVID-19    - nirmatrelvir and ritonavir (PAXLOVID) therapy pack; Take 3 tablets by mouth 2 times daily for 5 days Take 2 Nirmatrelvir tablets and 1 Ritonavir tablet twice daily for 5 days.[id number]}     COVID-19 positive patient.  Encounter for consideration of medication intervention. Patient does qualify for a prescription. Full discussion with patient including medication options, risks and benefits. Potential drug interactions reviewed with patient.     Treatment Planned Paxlovid RX sent to [facility]    Temporary change to home medications:   Hold atorvastatin while on Paxlovid x 5 days.  May resume 7 days after last Paxlovid dose.    Estimated body mass index is 46.82 kg/m  as calculated from the following:    Height as of 8/24/18: 1.599 m (5' 2.95\").    Weight as of 8/24/18: 119.7 kg (263 lb 14.3 oz).  GFR Estimate   Date Value Ref Range Status   06/22/2018 75 >60 mL/min/1.7m2 Final     Comment:     Non  GFR Calc     Janette Baez MD  Virtual Urgent Care  [facility]    Subjective   Sabrina is a 51 year old, presenting for the following health issues:  No chief complaint on file.      HPI     Sx started 7/1 and tested + 7/1.  Fatigued.  Rhinorrhea.  Weak.  Dry cough.  COVID vaccinated and boosted.      Review of Systems   Constitutional, HEENT, cardiovascular, pulmonary, GI, , musculoskeletal, neuro, skin, endocrine and psych systems are negative, except as otherwise noted.      Objective           Vitals:  No vitals were obtained today due to virtual visit.    Physical Exam   GENERAL: Healthy, alert and no distress  PSYCH: Mentation appears normal, affect normal/bright, judgement and insight intact, normal speech and appearance " well-groomed.    ..   Phone call duration # 10 minutes.

## 2022-11-19 ENCOUNTER — HEALTH MAINTENANCE LETTER (OUTPATIENT)
Age: 52
End: 2022-11-19

## 2023-04-09 ENCOUNTER — HEALTH MAINTENANCE LETTER (OUTPATIENT)
Age: 53
End: 2023-04-09

## 2024-06-15 ENCOUNTER — HEALTH MAINTENANCE LETTER (OUTPATIENT)
Age: 54
End: 2024-06-15

## 2025-04-13 ENCOUNTER — HEALTH MAINTENANCE LETTER (OUTPATIENT)
Age: 55
End: 2025-04-13

## 2025-07-06 ENCOUNTER — HEALTH MAINTENANCE LETTER (OUTPATIENT)
Age: 55
End: 2025-07-06